# Patient Record
Sex: FEMALE | Race: WHITE | NOT HISPANIC OR LATINO | URBAN - METROPOLITAN AREA
[De-identification: names, ages, dates, MRNs, and addresses within clinical notes are randomized per-mention and may not be internally consistent; named-entity substitution may affect disease eponyms.]

---

## 2017-11-06 ENCOUNTER — INPATIENT (INPATIENT)
Facility: HOSPITAL | Age: 62
LOS: 1 days | Discharge: HOME | DRG: 310 | End: 2017-11-08
Attending: HOSPITALIST | Admitting: STUDENT IN AN ORGANIZED HEALTH CARE EDUCATION/TRAINING PROGRAM
Payer: MEDICAID

## 2017-11-06 ENCOUNTER — TRANSCRIPTION ENCOUNTER (OUTPATIENT)
Age: 62
End: 2017-11-06

## 2017-11-06 VITALS
TEMPERATURE: 98 F | DIASTOLIC BLOOD PRESSURE: 84 MMHG | HEART RATE: 65 BPM | RESPIRATION RATE: 16 BRPM | OXYGEN SATURATION: 95 % | SYSTOLIC BLOOD PRESSURE: 162 MMHG

## 2017-11-06 DIAGNOSIS — R11.2 NAUSEA WITH VOMITING, UNSPECIFIED: ICD-10-CM

## 2017-11-06 DIAGNOSIS — R03.0 ELEVATED BLOOD-PRESSURE READING, WITHOUT DIAGNOSIS OF HYPERTENSION: ICD-10-CM

## 2017-11-06 DIAGNOSIS — R70.0 ELEVATED ERYTHROCYTE SEDIMENTATION RATE: ICD-10-CM

## 2017-11-06 DIAGNOSIS — Z29.9 ENCOUNTER FOR PROPHYLACTIC MEASURES, UNSPECIFIED: ICD-10-CM

## 2017-11-06 DIAGNOSIS — R94.31 ABNORMAL ELECTROCARDIOGRAM [ECG] [EKG]: ICD-10-CM

## 2017-11-06 LAB
ALBUMIN SERPL ELPH-MCNC: 4.4 G/DL — SIGNIFICANT CHANGE UP (ref 3.3–5)
ALP SERPL-CCNC: 59 U/L — SIGNIFICANT CHANGE UP (ref 40–120)
ALT FLD-CCNC: 20 U/L RC — SIGNIFICANT CHANGE UP (ref 10–45)
ANION GAP SERPL CALC-SCNC: 11 MMOL/L — SIGNIFICANT CHANGE UP (ref 5–17)
APTT BLD: 34.7 SEC — SIGNIFICANT CHANGE UP (ref 27.5–37.4)
AST SERPL-CCNC: 21 U/L — SIGNIFICANT CHANGE UP (ref 10–40)
BASE EXCESS BLDV CALC-SCNC: 3 MMOL/L — HIGH (ref -2–2)
BASOPHILS # BLD AUTO: 0 K/UL — SIGNIFICANT CHANGE UP (ref 0–0.2)
BASOPHILS NFR BLD AUTO: 0.4 % — SIGNIFICANT CHANGE UP (ref 0–2)
BILIRUB SERPL-MCNC: 0.4 MG/DL — SIGNIFICANT CHANGE UP (ref 0.2–1.2)
BUN SERPL-MCNC: 16 MG/DL — SIGNIFICANT CHANGE UP (ref 7–23)
CA-I SERPL-SCNC: 1.17 MMOL/L — SIGNIFICANT CHANGE UP (ref 1.12–1.3)
CALCIUM SERPL-MCNC: 9.1 MG/DL — SIGNIFICANT CHANGE UP (ref 8.4–10.5)
CHLORIDE BLDV-SCNC: 106 MMOL/L — SIGNIFICANT CHANGE UP (ref 96–108)
CHLORIDE SERPL-SCNC: 106 MMOL/L — SIGNIFICANT CHANGE UP (ref 96–108)
CK MB BLD-MCNC: 1.6 % — SIGNIFICANT CHANGE UP (ref 0–3.5)
CK MB CFR SERPL CALC: 1.3 NG/ML — SIGNIFICANT CHANGE UP (ref 0–3.8)
CK SERPL-CCNC: 81 U/L — SIGNIFICANT CHANGE UP (ref 25–170)
CO2 BLDV-SCNC: 30 MMOL/L — SIGNIFICANT CHANGE UP (ref 22–30)
CO2 SERPL-SCNC: 27 MMOL/L — SIGNIFICANT CHANGE UP (ref 22–31)
CREAT SERPL-MCNC: 0.72 MG/DL — SIGNIFICANT CHANGE UP (ref 0.5–1.3)
EOSINOPHIL # BLD AUTO: 0 K/UL — SIGNIFICANT CHANGE UP (ref 0–0.5)
EOSINOPHIL NFR BLD AUTO: 0.5 % — SIGNIFICANT CHANGE UP (ref 0–6)
GAS PNL BLDV: 138 MMOL/L — SIGNIFICANT CHANGE UP (ref 136–145)
GAS PNL BLDV: SIGNIFICANT CHANGE UP
GLUCOSE BLDV-MCNC: 118 MG/DL — HIGH (ref 70–99)
GLUCOSE SERPL-MCNC: 121 MG/DL — HIGH (ref 70–99)
HCO3 BLDV-SCNC: 28 MMOL/L — SIGNIFICANT CHANGE UP (ref 21–29)
HCT VFR BLD CALC: 41.2 % — SIGNIFICANT CHANGE UP (ref 34.5–45)
HCT VFR BLDA CALC: 42 % — SIGNIFICANT CHANGE UP (ref 39–50)
HGB BLD CALC-MCNC: 13.8 G/DL — SIGNIFICANT CHANGE UP (ref 11.5–15.5)
HGB BLD-MCNC: 13.6 G/DL — SIGNIFICANT CHANGE UP (ref 11.5–15.5)
INR BLD: 1.15 RATIO — SIGNIFICANT CHANGE UP (ref 0.88–1.16)
LACTATE BLDV-MCNC: 1.2 MMOL/L — SIGNIFICANT CHANGE UP (ref 0.7–2)
LIDOCAIN IGE QN: 26 U/L — SIGNIFICANT CHANGE UP (ref 7–60)
LYMPHOCYTES # BLD AUTO: 1.4 K/UL — SIGNIFICANT CHANGE UP (ref 1–3.3)
LYMPHOCYTES # BLD AUTO: 19.7 % — SIGNIFICANT CHANGE UP (ref 13–44)
MCHC RBC-ENTMCNC: 29.7 PG — SIGNIFICANT CHANGE UP (ref 27–34)
MCHC RBC-ENTMCNC: 33.1 GM/DL — SIGNIFICANT CHANGE UP (ref 32–36)
MCV RBC AUTO: 89.6 FL — SIGNIFICANT CHANGE UP (ref 80–100)
MONOCYTES # BLD AUTO: 0.3 K/UL — SIGNIFICANT CHANGE UP (ref 0–0.9)
MONOCYTES NFR BLD AUTO: 4.4 % — SIGNIFICANT CHANGE UP (ref 2–14)
NEUTROPHILS # BLD AUTO: 5.2 K/UL — SIGNIFICANT CHANGE UP (ref 1.8–7.4)
NEUTROPHILS NFR BLD AUTO: 75.1 % — SIGNIFICANT CHANGE UP (ref 43–77)
PCO2 BLDV: 47 MMHG — SIGNIFICANT CHANGE UP (ref 35–50)
PH BLDV: 7.4 — SIGNIFICANT CHANGE UP (ref 7.35–7.45)
PLATELET # BLD AUTO: 185 K/UL — SIGNIFICANT CHANGE UP (ref 150–400)
PO2 BLDV: 34 MMHG — SIGNIFICANT CHANGE UP (ref 25–45)
POTASSIUM BLDV-SCNC: 4.1 MMOL/L — SIGNIFICANT CHANGE UP (ref 3.5–5)
POTASSIUM SERPL-MCNC: 4.7 MMOL/L — SIGNIFICANT CHANGE UP (ref 3.5–5.3)
POTASSIUM SERPL-SCNC: 4.7 MMOL/L — SIGNIFICANT CHANGE UP (ref 3.5–5.3)
PROT SERPL-MCNC: 7 G/DL — SIGNIFICANT CHANGE UP (ref 6–8.3)
PROTHROM AB SERPL-ACNC: 12.6 SEC — SIGNIFICANT CHANGE UP (ref 9.8–12.7)
RAPID RVP RESULT: SIGNIFICANT CHANGE UP
RBC # BLD: 4.6 M/UL — SIGNIFICANT CHANGE UP (ref 3.8–5.2)
RBC # FLD: 11.7 % — SIGNIFICANT CHANGE UP (ref 10.3–14.5)
SAO2 % BLDV: 61 % — LOW (ref 67–88)
SODIUM SERPL-SCNC: 144 MMOL/L — SIGNIFICANT CHANGE UP (ref 135–145)
TROPONIN T SERPL-MCNC: <0.01 NG/ML — SIGNIFICANT CHANGE UP (ref 0–0.06)
TROPONIN T SERPL-MCNC: <0.01 NG/ML — SIGNIFICANT CHANGE UP (ref 0–0.06)
WBC # BLD: 7 K/UL — SIGNIFICANT CHANGE UP (ref 3.8–10.5)
WBC # FLD AUTO: 7 K/UL — SIGNIFICANT CHANGE UP (ref 3.8–10.5)

## 2017-11-06 PROCEDURE — 74020: CPT | Mod: 26

## 2017-11-06 PROCEDURE — 71010: CPT | Mod: 26

## 2017-11-06 PROCEDURE — 93010 ELECTROCARDIOGRAM REPORT: CPT

## 2017-11-06 PROCEDURE — 99223 1ST HOSP IP/OBS HIGH 75: CPT

## 2017-11-06 PROCEDURE — 99285 EMERGENCY DEPT VISIT HI MDM: CPT | Mod: 25

## 2017-11-06 RX ORDER — ASPIRIN/CALCIUM CARB/MAGNESIUM 324 MG
81 TABLET ORAL DAILY
Qty: 0 | Refills: 0 | Status: DISCONTINUED | OUTPATIENT
Start: 2017-11-06 | End: 2017-11-08

## 2017-11-06 RX ORDER — ONDANSETRON 8 MG/1
4 TABLET, FILM COATED ORAL EVERY 4 HOURS
Qty: 0 | Refills: 0 | Status: DISCONTINUED | OUTPATIENT
Start: 2017-11-06 | End: 2017-11-08

## 2017-11-06 RX ORDER — POLYETHYLENE GLYCOL 3350 17 G/17G
17 POWDER, FOR SOLUTION ORAL DAILY
Qty: 0 | Refills: 0 | Status: DISCONTINUED | OUTPATIENT
Start: 2017-11-06 | End: 2017-11-08

## 2017-11-06 RX ORDER — SODIUM CHLORIDE 9 MG/ML
3 INJECTION INTRAMUSCULAR; INTRAVENOUS; SUBCUTANEOUS ONCE
Qty: 0 | Refills: 0 | Status: COMPLETED | OUTPATIENT
Start: 2017-11-06 | End: 2017-11-06

## 2017-11-06 RX ORDER — ENOXAPARIN SODIUM 100 MG/ML
40 INJECTION SUBCUTANEOUS DAILY
Qty: 0 | Refills: 0 | Status: DISCONTINUED | OUTPATIENT
Start: 2017-11-06 | End: 2017-11-08

## 2017-11-06 RX ORDER — SODIUM CHLORIDE 9 MG/ML
1000 INJECTION INTRAMUSCULAR; INTRAVENOUS; SUBCUTANEOUS
Qty: 0 | Refills: 0 | Status: DISCONTINUED | OUTPATIENT
Start: 2017-11-06 | End: 2017-11-08

## 2017-11-06 RX ORDER — ASPIRIN/CALCIUM CARB/MAGNESIUM 324 MG
325 TABLET ORAL ONCE
Qty: 0 | Refills: 0 | Status: COMPLETED | OUTPATIENT
Start: 2017-11-06 | End: 2017-11-06

## 2017-11-06 RX ORDER — SODIUM CHLORIDE 9 MG/ML
1000 INJECTION INTRAMUSCULAR; INTRAVENOUS; SUBCUTANEOUS ONCE
Qty: 0 | Refills: 0 | Status: COMPLETED | OUTPATIENT
Start: 2017-11-06 | End: 2017-11-06

## 2017-11-06 RX ORDER — ACETAMINOPHEN 500 MG
650 TABLET ORAL EVERY 6 HOURS
Qty: 0 | Refills: 0 | Status: DISCONTINUED | OUTPATIENT
Start: 2017-11-06 | End: 2017-11-08

## 2017-11-06 RX ADMIN — Medication 325 MILLIGRAM(S): at 12:10

## 2017-11-06 RX ADMIN — ENOXAPARIN SODIUM 40 MILLIGRAM(S): 100 INJECTION SUBCUTANEOUS at 18:12

## 2017-11-06 RX ADMIN — SODIUM CHLORIDE 80 MILLILITER(S): 9 INJECTION INTRAMUSCULAR; INTRAVENOUS; SUBCUTANEOUS at 16:12

## 2017-11-06 RX ADMIN — SODIUM CHLORIDE 80 MILLILITER(S): 9 INJECTION INTRAMUSCULAR; INTRAVENOUS; SUBCUTANEOUS at 16:11

## 2017-11-06 RX ADMIN — ONDANSETRON 4 MILLIGRAM(S): 8 TABLET, FILM COATED ORAL at 16:12

## 2017-11-06 RX ADMIN — SODIUM CHLORIDE 1000 MILLILITER(S): 9 INJECTION INTRAMUSCULAR; INTRAVENOUS; SUBCUTANEOUS at 12:11

## 2017-11-06 RX ADMIN — SODIUM CHLORIDE 3 MILLILITER(S): 9 INJECTION INTRAMUSCULAR; INTRAVENOUS; SUBCUTANEOUS at 16:12

## 2017-11-06 NOTE — H&P ADULT - NEGATIVE CARDIOVASCULAR SYMPTOMS
no chest pain/no claudication/no orthopnea/no peripheral edema/no paroxysmal nocturnal dyspnea/no palpitations

## 2017-11-06 NOTE — H&P ADULT - RS GEN PE MLT RESP DETAILS PC
breath sounds equal/no chest wall tenderness/clear to auscultation bilaterally/respirations non-labored/no rhonchi/no wheezes/good air movement/no rales

## 2017-11-06 NOTE — H&P ADULT - PROBLEM SELECTOR PLAN 3
Pt with elevated BP readings, no hx of HTN  Continue to monitor BP for now prior to initiating antihypertensives

## 2017-11-06 NOTE — ED PROVIDER NOTE - CARE PLAN
Principal Discharge DX:	Nausea and vomiting  Secondary Diagnosis:	Exertional dyspnea Principal Discharge DX:	EKG abnormalities  Secondary Diagnosis:	Non-intractable vomiting with nausea, unspecified vomiting type

## 2017-11-06 NOTE — ED PROVIDER NOTE - MEDICAL DECISION MAKING DETAILS
62 year old female otherwise healthy with 3 days of non-specific nausea, vomiting, dizziness and seems to worsen with exertion. Also with 2 weeks of exertional dyspnea. Sent in from  today with EKG abnormalities. ST depressions in the inferior leads. Patient will require cardiac work up, labs, fluids, likely admission. Will treat with full dose aspirin in context of symptoms concerning atypical ACS.

## 2017-11-06 NOTE — H&P ADULT - PROBLEM SELECTOR PLAN 1
Pt with non specific symptoms of nausea/vomiting, lightheadedness, decreased appetite likely in the setting of viral etiology/ gastroenteritis. Pt had similar symptoms in October that resolved   No focal neuro defecits on exam, abdominal exam benign   IVF NS @ 80cc/hr   Zofran prn for nausea, vomiting  Abdominal xray ordered in ED to r/o SBO, pending results  Clears diet for now, advance as tolerated  Check orthostatics

## 2017-11-06 NOTE — ED ADULT NURSE NOTE - OBJECTIVE STATEMENT
61 yo female presents to ED with complaints of lightheadedness and reports EKG changes noticed at urgent care. 63 yo female presents to ED with complaints of lightheadedness and reports EKG changes noticed at urgent care. Patient is A&O x3. 63 yo female presents to ED with complaints of lightheadedness and reports EKG changes noticed at urgent care. Patient is A&O x3. S1 S2 sounds audible. Patient denies chest pain and SOB but complaints of lightheadedness. Lungs clear and equal joaquin. Patient reports nausea and dry heaving without vomitus which prompted her to go to urgent care. Abdomen is soft and non tender. Patient denies fevers, chills, and diarrhea. Skin is warm and dry. VSS/ NAD at this time.  is at bedside. Safety and comfort initiated and maintained. Will continue to monitor.

## 2017-11-06 NOTE — H&P ADULT - HISTORY OF PRESENT ILLNESS
62 year old female with hx of essential tremor presents with symptoms of nausea, vomiting, dizziness, lightheadedness, decreased appetite that began saturday morning. Pt was at a hair salon, left the salon and then experienced nausea, lightheadedness. Pt initially attributed the symptoms to fragrance at the salon however her symptoms persisted through the weekend which prompted her to go to an urgent care center in [place]. The  center performed an EKG and advised pt to come into the ED. Pt reports lightheadedness, like the room is spinning, symptoms worse when she gets up from a lying position, associated with decreased appetite and poor PO intake. Also complains of fatigue, symptoms resolve when she is lying down. Denies headache, blurry vision, symptoms not exacerbated with light or noise. Report episode of nbnb vomiting today with dry heaves. Reports similar symptoms in October which resolved on their own. Denies chest pain, pressure, palpitations, melena, hematochezia, no hematuria or dysuria. Reports dyspnea on exertion on walking a few blocks. Reports she has recently been anxious as her mother passed away 1 year ago around this time. No sick contacts or recent travel history.

## 2017-11-06 NOTE — H&P ADULT - ASSESSMENT
62 year old female with hx of essential tremor presents with symptoms of nausea, vomiting, dizziness, lightheadedness, decreased appetite that began saturday morning. 62 year old female with hx of essential tremor presents with symptoms of nausea, vomiting, dizziness, lightheadedness, decreased appetite that began saturday morning, found to have ST/T changes on EKG

## 2017-11-06 NOTE — PROVIDER CONTACT NOTE (OTHER) - ASSESSMENT
A&Ox4. Pt was sleeping at the time of the episode. Pt denies any discomfort. /71, HR 63, RR 17, Temp 97.7, O2sat 98%RA. Pt is not currently on any beta blockers.

## 2017-11-06 NOTE — ED PROVIDER NOTE - NS_ ATTENDINGSCRIBEDETAILS _ED_A_ED_FT
The scribe's documentation has been prepared under my direction and personally reviewed by me in its entirety. I confirm that the note above accurately reflects all work, treatment, procedures, and medical decision making performed by me (Dr. Carbajal).

## 2017-11-06 NOTE — ED PROVIDER NOTE - OBJECTIVE STATEMENT
61 yo female with no significant pmhx/pshx of presents with slight chest palpations, dizziness, light headed, poor appetite, exertional dyspnea and dry heaving x 4 days. Had similar episode and lasted around the same time period but resolved spontaneously. Went to  and had abnormal EKG and was sent to ED. Currently feeling nauseous. Patient states walking around makes her dizzy and SOB worsens. No fever or chills or cough. No chest pain. Does take Advil for headaches. NKDA.   PMD- Phillip Padgett

## 2017-11-06 NOTE — H&P ADULT - NSHPSOCIALHISTORY_GEN_ALL_CORE
Drinks wine 3-4x/week  Quit smoking 25 years ago, smoked for 7 years, 1/2 pk/day  No iv drug use  Independent with ADL's   Works part time in sales

## 2017-11-06 NOTE — H&P ADULT - NEUROLOGICAL DETAILS
alert and oriented x 3/sensation intact/normal strength/deep reflexes intact/cranial nerves intact/responds to pain/responds to verbal commands

## 2017-11-06 NOTE — PROVIDER CONTACT NOTE (OTHER) - ACTION/TREATMENT ORDERED:
NP agreed with recommendations. Will continue to monitor pt and will notify NP if any changes/ another episode of bradycardia.

## 2017-11-06 NOTE — H&P ADULT - PROBLEM SELECTOR PLAN 2
Pt with ST depressions in II, III, avF, and V4-V6  EKG per outpt records in 2016 sinus bradycardia 47 no st/t changes  First troponin negative  No hx of angina, chest pressure or palpitations  Trend serial troponins and EKG  Tele monitoring  If serial troponins uptrending, manage as nstemi and call cardiology evaluation   Check echo to evaluate LV function

## 2017-11-07 DIAGNOSIS — R00.1 BRADYCARDIA, UNSPECIFIED: ICD-10-CM

## 2017-11-07 DIAGNOSIS — R11.2 NAUSEA WITH VOMITING, UNSPECIFIED: ICD-10-CM

## 2017-11-07 LAB
ALBUMIN SERPL ELPH-MCNC: 3.8 G/DL — SIGNIFICANT CHANGE UP (ref 3.3–5)
ALP SERPL-CCNC: 51 U/L — SIGNIFICANT CHANGE UP (ref 40–120)
ALT FLD-CCNC: 17 U/L RC — SIGNIFICANT CHANGE UP (ref 10–45)
ANION GAP SERPL CALC-SCNC: 11 MMOL/L — SIGNIFICANT CHANGE UP (ref 5–17)
ANION GAP SERPL CALC-SCNC: 11 MMOL/L — SIGNIFICANT CHANGE UP (ref 5–17)
APPEARANCE UR: CLEAR — SIGNIFICANT CHANGE UP
AST SERPL-CCNC: 18 U/L — SIGNIFICANT CHANGE UP (ref 10–40)
BASOPHILS # BLD AUTO: 0 K/UL — SIGNIFICANT CHANGE UP (ref 0–0.2)
BASOPHILS NFR BLD AUTO: 0.3 % — SIGNIFICANT CHANGE UP (ref 0–2)
BILIRUB SERPL-MCNC: 0.5 MG/DL — SIGNIFICANT CHANGE UP (ref 0.2–1.2)
BILIRUB UR-MCNC: NEGATIVE — SIGNIFICANT CHANGE UP
BUN SERPL-MCNC: 11 MG/DL — SIGNIFICANT CHANGE UP (ref 7–23)
BUN SERPL-MCNC: 12 MG/DL — SIGNIFICANT CHANGE UP (ref 7–23)
CALCIUM SERPL-MCNC: 8.4 MG/DL — SIGNIFICANT CHANGE UP (ref 8.4–10.5)
CALCIUM SERPL-MCNC: 8.4 MG/DL — SIGNIFICANT CHANGE UP (ref 8.4–10.5)
CHLORIDE SERPL-SCNC: 106 MMOL/L — SIGNIFICANT CHANGE UP (ref 96–108)
CHLORIDE SERPL-SCNC: 107 MMOL/L — SIGNIFICANT CHANGE UP (ref 96–108)
CHOLEST SERPL-MCNC: 185 MG/DL — SIGNIFICANT CHANGE UP (ref 10–199)
CK MB BLD-MCNC: 2.3 % — SIGNIFICANT CHANGE UP (ref 0–3.5)
CK MB CFR SERPL CALC: 1.6 NG/ML — SIGNIFICANT CHANGE UP (ref 0–3.8)
CK SERPL-CCNC: 69 U/L — SIGNIFICANT CHANGE UP (ref 25–170)
CO2 SERPL-SCNC: 24 MMOL/L — SIGNIFICANT CHANGE UP (ref 22–31)
CO2 SERPL-SCNC: 24 MMOL/L — SIGNIFICANT CHANGE UP (ref 22–31)
COLOR SPEC: YELLOW — SIGNIFICANT CHANGE UP
CREAT SERPL-MCNC: 0.7 MG/DL — SIGNIFICANT CHANGE UP (ref 0.5–1.3)
CREAT SERPL-MCNC: 0.72 MG/DL — SIGNIFICANT CHANGE UP (ref 0.5–1.3)
DIFF PNL FLD: NEGATIVE — SIGNIFICANT CHANGE UP
EOSINOPHIL # BLD AUTO: 0.1 K/UL — SIGNIFICANT CHANGE UP (ref 0–0.5)
EOSINOPHIL NFR BLD AUTO: 1.1 % — SIGNIFICANT CHANGE UP (ref 0–6)
GLUCOSE SERPL-MCNC: 88 MG/DL — SIGNIFICANT CHANGE UP (ref 70–99)
GLUCOSE SERPL-MCNC: 89 MG/DL — SIGNIFICANT CHANGE UP (ref 70–99)
GLUCOSE UR QL: NEGATIVE — SIGNIFICANT CHANGE UP
HBA1C BLD-MCNC: 5.7 % — HIGH (ref 4–5.6)
HCT VFR BLD CALC: 36.1 % — SIGNIFICANT CHANGE UP (ref 34.5–45)
HCT VFR BLD CALC: 36.9 % — SIGNIFICANT CHANGE UP (ref 34.5–45)
HDLC SERPL-MCNC: 51 MG/DL — SIGNIFICANT CHANGE UP (ref 40–125)
HGB BLD-MCNC: 12.4 G/DL — SIGNIFICANT CHANGE UP (ref 11.5–15.5)
HGB BLD-MCNC: 12.7 G/DL — SIGNIFICANT CHANGE UP (ref 11.5–15.5)
KETONES UR-MCNC: NEGATIVE — SIGNIFICANT CHANGE UP
LEUKOCYTE ESTERASE UR-ACNC: ABNORMAL
LIPID PNL WITH DIRECT LDL SERPL: 111 MG/DL — SIGNIFICANT CHANGE UP
LYMPHOCYTES # BLD AUTO: 1.8 K/UL — SIGNIFICANT CHANGE UP (ref 1–3.3)
LYMPHOCYTES # BLD AUTO: 36 % — SIGNIFICANT CHANGE UP (ref 13–44)
MAGNESIUM SERPL-MCNC: 2 MG/DL — SIGNIFICANT CHANGE UP (ref 1.6–2.6)
MCHC RBC-ENTMCNC: 30.6 PG — SIGNIFICANT CHANGE UP (ref 27–34)
MCHC RBC-ENTMCNC: 30.7 PG — SIGNIFICANT CHANGE UP (ref 27–34)
MCHC RBC-ENTMCNC: 34.3 GM/DL — SIGNIFICANT CHANGE UP (ref 32–36)
MCHC RBC-ENTMCNC: 34.5 GM/DL — SIGNIFICANT CHANGE UP (ref 32–36)
MCV RBC AUTO: 89 FL — SIGNIFICANT CHANGE UP (ref 80–100)
MCV RBC AUTO: 89.3 FL — SIGNIFICANT CHANGE UP (ref 80–100)
MONOCYTES # BLD AUTO: 0.4 K/UL — SIGNIFICANT CHANGE UP (ref 0–0.9)
MONOCYTES NFR BLD AUTO: 7.6 % — SIGNIFICANT CHANGE UP (ref 2–14)
NEUTROPHILS # BLD AUTO: 2.8 K/UL — SIGNIFICANT CHANGE UP (ref 1.8–7.4)
NEUTROPHILS NFR BLD AUTO: 55 % — SIGNIFICANT CHANGE UP (ref 43–77)
NITRITE UR-MCNC: NEGATIVE — SIGNIFICANT CHANGE UP
PH UR: 6.5 — SIGNIFICANT CHANGE UP (ref 5–8)
PHOSPHATE SERPL-MCNC: 3 MG/DL — SIGNIFICANT CHANGE UP (ref 2.5–4.5)
PLATELET # BLD AUTO: 159 K/UL — SIGNIFICANT CHANGE UP (ref 150–400)
PLATELET # BLD AUTO: 161 K/UL — SIGNIFICANT CHANGE UP (ref 150–400)
POTASSIUM SERPL-MCNC: 3.8 MMOL/L — SIGNIFICANT CHANGE UP (ref 3.5–5.3)
POTASSIUM SERPL-MCNC: 3.9 MMOL/L — SIGNIFICANT CHANGE UP (ref 3.5–5.3)
POTASSIUM SERPL-SCNC: 3.8 MMOL/L — SIGNIFICANT CHANGE UP (ref 3.5–5.3)
POTASSIUM SERPL-SCNC: 3.9 MMOL/L — SIGNIFICANT CHANGE UP (ref 3.5–5.3)
PROT SERPL-MCNC: 6 G/DL — SIGNIFICANT CHANGE UP (ref 6–8.3)
PROT UR-MCNC: NEGATIVE — SIGNIFICANT CHANGE UP
RBC # BLD: 4.06 M/UL — SIGNIFICANT CHANGE UP (ref 3.8–5.2)
RBC # BLD: 4.14 M/UL — SIGNIFICANT CHANGE UP (ref 3.8–5.2)
RBC # FLD: 11.5 % — SIGNIFICANT CHANGE UP (ref 10.3–14.5)
RBC # FLD: 11.6 % — SIGNIFICANT CHANGE UP (ref 10.3–14.5)
SODIUM SERPL-SCNC: 141 MMOL/L — SIGNIFICANT CHANGE UP (ref 135–145)
SODIUM SERPL-SCNC: 142 MMOL/L — SIGNIFICANT CHANGE UP (ref 135–145)
SP GR SPEC: 1.02 — SIGNIFICANT CHANGE UP (ref 1.01–1.02)
TOTAL CHOLESTEROL/HDL RATIO MEASUREMENT: 3.6 RATIO — SIGNIFICANT CHANGE UP (ref 3.3–7.1)
TRIGL SERPL-MCNC: 114 MG/DL — SIGNIFICANT CHANGE UP (ref 10–149)
TROPONIN T SERPL-MCNC: <0.01 NG/ML — SIGNIFICANT CHANGE UP (ref 0–0.06)
TSH SERPL-MCNC: 3.94 UIU/ML — SIGNIFICANT CHANGE UP (ref 0.27–4.2)
UROBILINOGEN FLD QL: NEGATIVE — SIGNIFICANT CHANGE UP
WBC # BLD: 5.1 K/UL — SIGNIFICANT CHANGE UP (ref 3.8–10.5)
WBC # BLD: 5.4 K/UL — SIGNIFICANT CHANGE UP (ref 3.8–10.5)
WBC # FLD AUTO: 5.1 K/UL — SIGNIFICANT CHANGE UP (ref 3.8–10.5)
WBC # FLD AUTO: 5.4 K/UL — SIGNIFICANT CHANGE UP (ref 3.8–10.5)

## 2017-11-07 PROCEDURE — 93010 ELECTROCARDIOGRAM REPORT: CPT

## 2017-11-07 PROCEDURE — 99233 SBSQ HOSP IP/OBS HIGH 50: CPT

## 2017-11-07 RX ADMIN — SODIUM CHLORIDE 80 MILLILITER(S): 9 INJECTION INTRAMUSCULAR; INTRAVENOUS; SUBCUTANEOUS at 02:04

## 2017-11-07 RX ADMIN — Medication 650 MILLIGRAM(S): at 18:54

## 2017-11-07 RX ADMIN — Medication 650 MILLIGRAM(S): at 15:57

## 2017-11-07 RX ADMIN — ENOXAPARIN SODIUM 40 MILLIGRAM(S): 100 INJECTION SUBCUTANEOUS at 11:14

## 2017-11-07 RX ADMIN — Medication 81 MILLIGRAM(S): at 11:14

## 2017-11-07 NOTE — PROGRESS NOTE ADULT - SUBJECTIVE AND OBJECTIVE BOX
Patient is a 62y old  Female who presents with a chief complaint of Nausea and dizziness (06 Nov 2017 15:41)      SUBJECTIVE / OVERNIGHT EVENTS: no acute events overnight. Patient's nausea and vomiting have improved. She feels better thos morning. Denies any chest pain or chest pressure.     MEDICATIONS  (STANDING):  aspirin  chewable 81 milliGRAM(s) Oral daily  enoxaparin Injectable 40 milliGRAM(s) SubCutaneous daily  sodium chloride 0.9%. 1000 milliLiter(s) (80 mL/Hr) IV Continuous <Continuous>    MEDICATIONS  (PRN):  acetaminophen   Tablet. 650 milliGRAM(s) Oral every 6 hours PRN Mild Pain (1 - 3)  ondansetron Injectable 4 milliGRAM(s) IV Push every 4 hours PRN Nausea and/or Vomiting  polyethylene glycol 3350 17 Gram(s) Oral daily PRN Constipation      Vital Signs Last 24 Hrs  T(C): 36.3 (07 Nov 2017 14:53), Max: 36.8 (06 Nov 2017 17:41)  T(F): 97.4 (07 Nov 2017 14:53), Max: 98.2 (06 Nov 2017 17:41)  HR: 59 (07 Nov 2017 14:53) (47 - 63)  BP: 115/69 (07 Nov 2017 14:53) (115/69 - 145/87)  BP(mean): --  RR: 16 (07 Nov 2017 14:53) (16 - 18)  SpO2: 97% (07 Nov 2017 14:53) (95% - 98%)  CAPILLARY BLOOD GLUCOSE        I&O's Summary    06 Nov 2017 07:01  -  07 Nov 2017 07:00  --------------------------------------------------------  IN: 960 mL / OUT: 0 mL / NET: 960 mL    07 Nov 2017 07:01  -  07 Nov 2017 15:24  --------------------------------------------------------  IN: 240 mL / OUT: 200 mL / NET: 40 mL        PHYSICAL EXAM:  GENERAL: NAD, resting comfortably   HEAD:  Atraumatic, Normocephalic  EYES: EOMI, PERRLA, conjunctiva and sclera clear  NECK: Supple,   CHEST/LUNG: Clear to auscultation bilaterally; No wheeze  HEART: Regular rate and rhythm; No murmurs  ABDOMEN: Soft, Nontender, Nondistended; Bowel sounds present  EXTREMITIES:  2+ Peripheral Pulses, No clubbing, cyanosis, or edema  PSYCH: AAOx3  NEUROLOGY: non-focal      LABS:                        12.7   5.1   )-----------( 161      ( 07 Nov 2017 04:55 )             36.9     11-07    142  |  107  |  11  ----------------------------<  89  3.8   |  24  |  0.72    Ca    8.4      07 Nov 2017 04:55  Phos  3.0     11-07  Mg     2.0     11-07    TPro  6.0  /  Alb  3.8  /  TBili  0.5  /  DBili  x   /  AST  18  /  ALT  17  /  AlkPhos  51  11-07    PT/INR - ( 06 Nov 2017 12:18 )   PT: 12.6 sec;   INR: 1.15 ratio         PTT - ( 06 Nov 2017 12:18 )  PTT:34.7 sec  CARDIAC MARKERS ( 07 Nov 2017 00:07 )  x     / <0.01 ng/mL / 69 U/L / x     / 1.6 ng/mL  CARDIAC MARKERS ( 06 Nov 2017 16:43 )  x     / <0.01 ng/mL / 81 U/L / x     / 1.3 ng/mL  CARDIAC MARKERS ( 06 Nov 2017 12:18 )  x     / <0.01 ng/mL / x     / x     / x

## 2017-11-07 NOTE — PROGRESS NOTE ADULT - PROBLEM SELECTOR PLAN 2
-echo is pending, ST abnormalities noted on admitting EKG   -concern that nausea and vomiting may not be from a true gastroenteritis, ?atypical cardiac symptoms?   -cardiology consult placed

## 2017-11-07 NOTE — PROVIDER CONTACT NOTE (OTHER) - ACTION/TREATMENT ORDERED:
NP agreed with above recommendations and NP will review/order labs for pt. Will continue to monitor pt.

## 2017-11-08 ENCOUNTER — TRANSCRIPTION ENCOUNTER (OUTPATIENT)
Age: 62
End: 2017-11-08

## 2017-11-08 VITALS
TEMPERATURE: 98 F | DIASTOLIC BLOOD PRESSURE: 81 MMHG | SYSTOLIC BLOOD PRESSURE: 170 MMHG | OXYGEN SATURATION: 99 % | RESPIRATION RATE: 17 BRPM

## 2017-11-08 LAB
CULTURE RESULTS: SIGNIFICANT CHANGE UP
SPECIMEN SOURCE: SIGNIFICANT CHANGE UP

## 2017-11-08 PROCEDURE — 93017 CV STRESS TEST TRACING ONLY: CPT

## 2017-11-08 PROCEDURE — 82947 ASSAY GLUCOSE BLOOD QUANT: CPT

## 2017-11-08 PROCEDURE — 93306 TTE W/DOPPLER COMPLETE: CPT | Mod: 26

## 2017-11-08 PROCEDURE — 99223 1ST HOSP IP/OBS HIGH 75: CPT | Mod: GC

## 2017-11-08 PROCEDURE — 83735 ASSAY OF MAGNESIUM: CPT

## 2017-11-08 PROCEDURE — 74020: CPT

## 2017-11-08 PROCEDURE — 80048 BASIC METABOLIC PNL TOTAL CA: CPT

## 2017-11-08 PROCEDURE — 85610 PROTHROMBIN TIME: CPT

## 2017-11-08 PROCEDURE — 71045 X-RAY EXAM CHEST 1 VIEW: CPT

## 2017-11-08 PROCEDURE — 85014 HEMATOCRIT: CPT

## 2017-11-08 PROCEDURE — 93306 TTE W/DOPPLER COMPLETE: CPT

## 2017-11-08 PROCEDURE — 85730 THROMBOPLASTIN TIME PARTIAL: CPT

## 2017-11-08 PROCEDURE — 87798 DETECT AGENT NOS DNA AMP: CPT

## 2017-11-08 PROCEDURE — 78452 HT MUSCLE IMAGE SPECT MULT: CPT | Mod: 26

## 2017-11-08 PROCEDURE — 82330 ASSAY OF CALCIUM: CPT

## 2017-11-08 PROCEDURE — 93016 CV STRESS TEST SUPVJ ONLY: CPT

## 2017-11-08 PROCEDURE — 87581 M.PNEUMON DNA AMP PROBE: CPT

## 2017-11-08 PROCEDURE — 87086 URINE CULTURE/COLONY COUNT: CPT

## 2017-11-08 PROCEDURE — 82553 CREATINE MB FRACTION: CPT

## 2017-11-08 PROCEDURE — 84295 ASSAY OF SERUM SODIUM: CPT

## 2017-11-08 PROCEDURE — 78452 HT MUSCLE IMAGE SPECT MULT: CPT

## 2017-11-08 PROCEDURE — 85027 COMPLETE CBC AUTOMATED: CPT

## 2017-11-08 PROCEDURE — 80053 COMPREHEN METABOLIC PANEL: CPT

## 2017-11-08 PROCEDURE — 93018 CV STRESS TEST I&R ONLY: CPT

## 2017-11-08 PROCEDURE — 84443 ASSAY THYROID STIM HORMONE: CPT

## 2017-11-08 PROCEDURE — 83605 ASSAY OF LACTIC ACID: CPT

## 2017-11-08 PROCEDURE — 83036 HEMOGLOBIN GLYCOSYLATED A1C: CPT

## 2017-11-08 PROCEDURE — 87633 RESP VIRUS 12-25 TARGETS: CPT

## 2017-11-08 PROCEDURE — 84100 ASSAY OF PHOSPHORUS: CPT

## 2017-11-08 PROCEDURE — 93005 ELECTROCARDIOGRAM TRACING: CPT

## 2017-11-08 PROCEDURE — 82550 ASSAY OF CK (CPK): CPT

## 2017-11-08 PROCEDURE — 82803 BLOOD GASES ANY COMBINATION: CPT

## 2017-11-08 PROCEDURE — 83690 ASSAY OF LIPASE: CPT

## 2017-11-08 PROCEDURE — 84132 ASSAY OF SERUM POTASSIUM: CPT

## 2017-11-08 PROCEDURE — 82435 ASSAY OF BLOOD CHLORIDE: CPT

## 2017-11-08 PROCEDURE — 80061 LIPID PANEL: CPT

## 2017-11-08 PROCEDURE — A9500: CPT

## 2017-11-08 PROCEDURE — 99285 EMERGENCY DEPT VISIT HI MDM: CPT | Mod: 25

## 2017-11-08 PROCEDURE — 87486 CHLMYD PNEUM DNA AMP PROBE: CPT

## 2017-11-08 PROCEDURE — 81001 URINALYSIS AUTO W/SCOPE: CPT

## 2017-11-08 PROCEDURE — 84484 ASSAY OF TROPONIN QUANT: CPT

## 2017-11-08 PROCEDURE — 99233 SBSQ HOSP IP/OBS HIGH 50: CPT

## 2017-11-08 RX ORDER — ASPIRIN/CALCIUM CARB/MAGNESIUM 324 MG
1 TABLET ORAL
Qty: 0 | Refills: 0 | COMMUNITY
Start: 2017-11-08

## 2017-11-08 RX ADMIN — Medication 81 MILLIGRAM(S): at 12:08

## 2017-11-08 NOTE — PROGRESS NOTE ADULT - PROBLEM SELECTOR PLAN 2
-echo was WNL   -stress test is pending  -cardio consult placed, to see patient today -echo was WNL   -stress test was done, results are pending, will f/u  -if stress test negative, can be discharged home today

## 2017-11-08 NOTE — CONSULT NOTE ADULT - SUBJECTIVE AND OBJECTIVE BOX
Date of Admission: 11/6/17     Patient is a 62y old  Female who presents with a chief complaint of Nausea and dizziness (06 Nov 2017 15:41)      HISTORY OF PRESENT ILLNESS:       Allergies    No Known Allergies    Intolerances    	    MEDICATIONS:  aspirin  chewable 81 milliGRAM(s) Oral daily  enoxaparin Injectable 40 milliGRAM(s) SubCutaneous daily        acetaminophen   Tablet. 650 milliGRAM(s) Oral every 6 hours PRN  ondansetron Injectable 4 milliGRAM(s) IV Push every 4 hours PRN    polyethylene glycol 3350 17 Gram(s) Oral daily PRN      sodium chloride 0.9%. 1000 milliLiter(s) IV Continuous <Continuous>      PAST MEDICAL & SURGICAL HISTORY:  No pertinent past medical history  No significant past surgical history      FAMILY HISTORY:      SOCIAL HISTORY:    Drinks wine 3-4x/week  Former Smoker Quit smoking 25 years ago, smoked for 7 years, 1/2 pk/day      REVIEW OF SYSTEMS:    CONSTITUTIONAL: No weakness, fevers or chills  EYES/ENT: No visual changes;  No dysphagia  NECK: No pain or stiffness  RESPIRATORY: No cough, wheezing, hemoptysis; No shortness of breath  CARDIOVASCULAR: No chest pain or palpitations; No lower extremity edema  GASTROINTESTINAL: No abdominal or epigastric pain. No nausea, vomiting, or hematemesis; No diarrhea or constipation. No melena or hematochezia.  BACK: No back pain  GENITOURINARY: No dysuria, frequency or hematuria  NEUROLOGICAL: No numbness or weakness  SKIN: No itching, burning, rashes, or lesions   All other review of systems is negative unless indicated above.  PHYSICAL EXAM:  T(C): 36.3 (11-08-17 @ 12:43), Max: 36.8 (11-08-17 @ 04:02)  HR: 61 (11-08-17 @ 12:43) (51 - 61)  BP: 120/76 (11-08-17 @ 12:43) (105/65 - 131/85)  RR: 17 (11-08-17 @ 12:43) (16 - 17)  SpO2: 98% (11-08-17 @ 12:43) (96% - 98%)  Wt(kg): --  I&O's Summary    07 Nov 2017 07:01  -  08 Nov 2017 07:00  --------------------------------------------------------  IN: 240 mL / OUT: 300 mL / NET: -60 mL    08 Nov 2017 07:01  -  08 Nov 2017 15:13  --------------------------------------------------------  IN: 600 mL / OUT: 0 mL / NET: 600 mL        Appearance: Normal	  HEENT:   Normal oral mucosa, PERRL, EOMI	  Lymphatic: No lymphadenopathy  Cardiovascular: Normal S1 S2, No JVD, No murmurs, No edema  Respiratory: Lungs clear to auscultation	  Psychiatry: A & O x 3, Mood & affect appropriate  Gastrointestinal:  Soft, Non-tender, + BS	  Skin: No rashes, No ecchymoses, No cyanosis	  Neurologic: Non-focal  Extremities: Normal range of motion, No clubbing, cyanosis or edema  Vascular: Peripheral pulses palpable 2+ bilaterally        LABS:	 	    CBC Full  -  ( 07 Nov 2017 04:55 )  WBC Count : 5.1 K/uL  Hemoglobin : 12.7 g/dL  Hematocrit : 36.9 %  Platelet Count - Automated : 161 K/uL  Mean Cell Volume : 89.3 fl  Mean Cell Hemoglobin : 30.6 pg  Mean Cell Hemoglobin Concentration : 34.3 gm/dL  Auto Neutrophil # : 2.8 K/uL  Auto Lymphocyte # : 1.8 K/uL  Auto Monocyte # : 0.4 K/uL  Auto Eosinophil # : 0.1 K/uL  Auto Basophil # : 0.0 K/uL  Auto Neutrophil % : 55.0 %  Auto Lymphocyte % : 36.0 %  Auto Monocyte % : 7.6 %  Auto Eosinophil % : 1.1 %  Auto Basophil % : 0.3 %    11-07    142  |  107  |  11  ----------------------------<  89  3.8   |  24  |  0.72  11-07    141  |  106  |  12  ----------------------------<  88  3.9   |  24  |  0.70    Ca    8.4      07 Nov 2017 04:55  Ca    8.4      07 Nov 2017 04:54  Phos  3.0     11-07  Mg     2.0     11-07    TPro  6.0  /  Alb  3.8  /  TBili  0.5  /  DBili  x   /  AST  18  /  ALT  17  /  AlkPhos  51  11-07      proBNP:   Lipid Profile:   HgA1c:   TSH:       CARDIAC MARKERS:            TELEMETRY: 	    ECG:  	  RADIOLOGY:  OTHER: 	    PREVIOUS DIAGNOSTIC TESTING:    [ ] Echocardiogram:  [ ]  Catheterization:  [ ] Stress Test:  	  	  ASSESSMENT/PLAN: Date of Admission: 11/6/17     Patient is a 62y old  Female who presents with a chief complaint of Nausea and dizziness (06 Nov 2017 15:41)      HISTORY OF PRESENT ILLNESS:      61 yo woman w/PMHx HLD sinus bradycardia and essential tremor presenting with nausea/vomiting and lightheadedness on standing cardiology consulted for ST depressions on EKG. Symptoms of nausea/vomiting improved. During this hospitalization, she underwent a TTE with normal LV function and no segmental wall motion abnormalities. During exercise nuclear stress she experienced chest pain.       Allergies    No Known Allergies    Intolerances    	    MEDICATIONS:  aspirin  chewable 81 milliGRAM(s) Oral daily  enoxaparin Injectable 40 milliGRAM(s) SubCutaneous daily        acetaminophen   Tablet. 650 milliGRAM(s) Oral every 6 hours PRN  ondansetron Injectable 4 milliGRAM(s) IV Push every 4 hours PRN    polyethylene glycol 3350 17 Gram(s) Oral daily PRN      sodium chloride 0.9%. 1000 milliLiter(s) IV Continuous <Continuous>      PAST MEDICAL & SURGICAL HISTORY:  No pertinent past medical history  No significant past surgical history      FAMILY HISTORY:      SOCIAL HISTORY:    Drinks wine 3-4x/week  Former Smoker Quit smoking 25 years ago, smoked for 7 years, 1/2 pk/day      REVIEW OF SYSTEMS:    CONSTITUTIONAL: No weakness, fevers or chills  EYES/ENT: No visual changes;  No dysphagia  NECK: No pain or stiffness  RESPIRATORY: No cough, wheezing, hemoptysis; No shortness of breath  CARDIOVASCULAR: No chest pain or palpitations; No lower extremity edema  GASTROINTESTINAL: No abdominal or epigastric pain. No nausea, vomiting, or hematemesis; No diarrhea or constipation. No melena or hematochezia.  BACK: No back pain  GENITOURINARY: No dysuria, frequency or hematuria  NEUROLOGICAL: No numbness or weakness  SKIN: No itching, burning, rashes, or lesions   All other review of systems is negative unless indicated above.  PHYSICAL EXAM:  T(C): 36.3 (11-08-17 @ 12:43), Max: 36.8 (11-08-17 @ 04:02)  HR: 61 (11-08-17 @ 12:43) (51 - 61)  BP: 120/76 (11-08-17 @ 12:43) (105/65 - 131/85)  RR: 17 (11-08-17 @ 12:43) (16 - 17)  SpO2: 98% (11-08-17 @ 12:43) (96% - 98%)  Wt(kg): --  I&O's Summary    07 Nov 2017 07:01  -  08 Nov 2017 07:00  --------------------------------------------------------  IN: 240 mL / OUT: 300 mL / NET: -60 mL    08 Nov 2017 07:01  -  08 Nov 2017 15:13  --------------------------------------------------------  IN: 600 mL / OUT: 0 mL / NET: 600 mL        Appearance: Normal	  HEENT:   Normal oral mucosa, PERRL, EOMI	  Lymphatic: No lymphadenopathy  Cardiovascular: Normal S1 S2, No JVD, No murmurs, No edema  Respiratory: Lungs clear to auscultation	  Psychiatry: A & O x 3, Mood & affect appropriate  Gastrointestinal:  Soft, Non-tender, + BS	  Skin: No rashes, No ecchymoses, No cyanosis	  Neurologic: Non-focal  Extremities: Normal range of motion, No clubbing, cyanosis or edema  Vascular: Peripheral pulses palpable 2+ bilaterally        LABS:	 	    CBC Full  -  ( 07 Nov 2017 04:55 )  WBC Count : 5.1 K/uL  Hemoglobin : 12.7 g/dL  Hematocrit : 36.9 %  Platelet Count - Automated : 161 K/uL  Mean Cell Volume : 89.3 fl  Mean Cell Hemoglobin : 30.6 pg  Mean Cell Hemoglobin Concentration : 34.3 gm/dL  Auto Neutrophil # : 2.8 K/uL  Auto Lymphocyte # : 1.8 K/uL  Auto Monocyte # : 0.4 K/uL  Auto Eosinophil # : 0.1 K/uL  Auto Basophil # : 0.0 K/uL  Auto Neutrophil % : 55.0 %  Auto Lymphocyte % : 36.0 %  Auto Monocyte % : 7.6 %  Auto Eosinophil % : 1.1 %  Auto Basophil % : 0.3 %    11-07    142  |  107  |  11  ----------------------------<  89  3.8   |  24  |  0.72  11-07    141  |  106  |  12  ----------------------------<  88  3.9   |  24  |  0.70    Ca    8.4      07 Nov 2017 04:55  Ca    8.4      07 Nov 2017 04:54  Phos  3.0     11-07  Mg     2.0     11-07    TPro  6.0  /  Alb  3.8  /  TBili  0.5  /  DBili  x   /  AST  18  /  ALT  17  /  AlkPhos  51  11-07    TELEMETRY: 	    ECG:  	  RADIOLOGY:  OTHER: 	    PREVIOUS DIAGNOSTIC TESTING:    [ ] Echocardiogram: < from: Transthoracic Echocardiogram (11.08.17 @ 15:31) >  Dimensions:    Normal Values:  LA:     2.7    2.0 - 4.0 cm  Ao:     2.5    2.0 - 3.8 cm  SEPTUM: 0.6    0.6 - 1.2 cm  PWT:    0.8    0.6 - 1.1 cm  LVIDd:  3.9    3.0 - 5.6 cm  LVIDs:  2.5    1.8 - 4.0 cm  Derived variables:  LVMI: 51 g/m2  RWT: 0.41  Fractional short: 36 %  EF (Visual Estimate): 65 %  EF (Teicholtz): 66 %    Conclusions:  1. Normal left ventricular internal dimensions and wall  thicknesses.  2. Normal left ventricular systolic function. No segmental  wall motion abnormalities.  3. Normal diastolic function  4. Normal right ventricular size and function.  5. Estimated pulmonary artery systolic pressure equals 22  mm Hg, assuming right atrial pressure equals 8  mm Hg,  consistent with normal pulmonary pressures.  *** No previous Echo exam.  ---------------------------------------------    ASSESSMENT/PLAN: Date of Admission: 11/6/17     Patient is a 62y old  Female who presents with a chief complaint of Nausea and dizziness (06 Nov 2017 15:41)      HISTORY OF PRESENT ILLNESS:     61 yo woman w/PMHx HLD sinus bradycardia and essential tremor presenting with nausea/vomiting and lightheadedness on standing cardiology consulted for ST depressions on EKG. Symptoms of nausea/vomiting improved. During this hospitalization, she underwent a TTE with normal LV function and no segmental wall motion abnormalities. After exercising on the treadmill during nuclear stress she noted non-radiating 5/10 chest pain that resolved after 1 minute. She noted TREJO while walking on the treadmill. She reports that she regularly walks 20blocks to work at least 3x a week without any exertional symptoms.        Allergies    No Known Allergies    Intolerances    	    MEDICATIONS:  aspirin  chewable 81 milliGRAM(s) Oral daily  enoxaparin Injectable 40 milliGRAM(s) SubCutaneous daily        acetaminophen   Tablet. 650 milliGRAM(s) Oral every 6 hours PRN  ondansetron Injectable 4 milliGRAM(s) IV Push every 4 hours PRN    polyethylene glycol 3350 17 Gram(s) Oral daily PRN      sodium chloride 0.9%. 1000 milliLiter(s) IV Continuous <Continuous>      PAST MEDICAL & SURGICAL HISTORY:  No pertinent past medical history  No significant past surgical history      FAMILY HISTORY:      SOCIAL HISTORY:    Drinks wine 3-4x/week  Former Smoker Quit smoking 25 years ago, smoked for 7 years, 1/2 pk/day      REVIEW OF SYSTEMS:    CONSTITUTIONAL: No weakness, fevers or chills  EYES/ENT: No visual changes;  No dysphagia  NECK: No pain or stiffness  RESPIRATORY: No cough, wheezing, hemoptysis; No shortness of breath  CARDIOVASCULAR: No chest pain or palpitations; No lower extremity edema  GASTROINTESTINAL: No abdominal or epigastric pain. No nausea, vomiting, or hematemesis; No diarrhea or constipation. No melena or hematochezia.  BACK: No back pain  GENITOURINARY: No dysuria, frequency or hematuria  NEUROLOGICAL: No numbness or weakness  SKIN: No itching, burning, rashes, or lesions   All other review of systems is negative unless indicated above.  PHYSICAL EXAM:  T(C): 36.3 (11-08-17 @ 12:43), Max: 36.8 (11-08-17 @ 04:02)  HR: 61 (11-08-17 @ 12:43) (51 - 61)  BP: 120/76 (11-08-17 @ 12:43) (105/65 - 131/85)  RR: 17 (11-08-17 @ 12:43) (16 - 17)  SpO2: 98% (11-08-17 @ 12:43) (96% - 98%)  Wt(kg): --  I&O's Summary    07 Nov 2017 07:01  -  08 Nov 2017 07:00  --------------------------------------------------------  IN: 240 mL / OUT: 300 mL / NET: -60 mL    08 Nov 2017 07:01  -  08 Nov 2017 15:13  --------------------------------------------------------  IN: 600 mL / OUT: 0 mL / NET: 600 mL        Appearance: Well appearing, pleasant  HEENT:   Normal oral mucosa, PERRL, EOMI	  Lymphatic: No lymphadenopathy  Cardiovascular: Normal S1 S2, No JVD, No murmurs, No edema  Respiratory: Lungs clear to auscultation	  Psychiatry: A & O x 3, Mood & affect appropriate  Gastrointestinal:  Soft, Non-tender, + BS	  Skin: No rashes, No ecchymoses, No cyanosis	  Neurologic: Non-focal  Extremities: Normal range of motion, No clubbing, cyanosis or edema  Vascular: Peripheral pulses palpable 2+ bilaterally        LABS:	 	    CBC Full  -  ( 07 Nov 2017 04:55 )  WBC Count : 5.1 K/uL  Hemoglobin : 12.7 g/dL  Hematocrit : 36.9 %  Platelet Count - Automated : 161 K/uL  Mean Cell Volume : 89.3 fl  Mean Cell Hemoglobin : 30.6 pg  Mean Cell Hemoglobin Concentration : 34.3 gm/dL  Auto Neutrophil # : 2.8 K/uL  Auto Lymphocyte # : 1.8 K/uL  Auto Monocyte # : 0.4 K/uL  Auto Eosinophil # : 0.1 K/uL  Auto Basophil # : 0.0 K/uL  Auto Neutrophil % : 55.0 %  Auto Lymphocyte % : 36.0 %  Auto Monocyte % : 7.6 %  Auto Eosinophil % : 1.1 %  Auto Basophil % : 0.3 %    11-07    142  |  107  |  11  ----------------------------<  89  3.8   |  24  |  0.72  11-07    141  |  106  |  12  ----------------------------<  88  3.9   |  24  |  0.70    Ca    8.4      07 Nov 2017 04:55  Ca    8.4      07 Nov 2017 04:54  Phos  3.0     11-07  Mg     2.0     11-07    TPro  6.0  /  Alb  3.8  /  TBili  0.5  /  DBili  x   /  AST  18  /  ALT  17  /  AlkPhos  51  11-07    TELEMETRY: 	  SR 50-70s     ECG:  	NSR 0.5 mm ST depressions V4, V5, V6 and inferior leads     PREVIOUS DIAGNOSTIC TESTING:    [ ] Echocardiogram: < from: Transthoracic Echocardiogram (11.08.17 @ 15:31) >  Dimensions:    Normal Values:  LA:     2.7    2.0 - 4.0 cm  Ao:     2.5    2.0 - 3.8 cm  SEPTUM: 0.6    0.6 - 1.2 cm  PWT:    0.8    0.6 - 1.1 cm  LVIDd:  3.9    3.0 - 5.6 cm  LVIDs:  2.5    1.8 - 4.0 cm  Derived variables:  LVMI: 51 g/m2  RWT: 0.41  Fractional short: 36 %  EF (Visual Estimate): 65 %  EF (Teicholtz): 66 %    Conclusions:  1. Normal left ventricular internal dimensions and wall  thicknesses.  2. Normal left ventricular systolic function. No segmental  wall motion abnormalities.  3. Normal diastolic function  4. Normal right ventricular size and function.  5. Estimated pulmonary artery systolic pressure equals 22  mm Hg, assuming right atrial pressure equals 8  mm Hg,  consistent with normal pulmonary pressures.  *** No previous Echo exam.  ---------------------------------------------        ASSESSMENT/PLAN: 	    62F w/HLD sinus bradycardia and essential tremor presenting with nausea/vomiting and lightheadedness on standing cardiology consulted for ST depressions on EKG with CP post stress test.     #EKG changes and CP post stress-- At baseline she has good exercise tolerance however notably had significant ST depressions during stress test. Former smoker and HLD.   - Consider CT Coronaries    #Sinus bradycardia--asymptomatic; Known prior history       [id number] Date of Admission: 11/6/17     Patient is a 62y old  Female who presents with a chief complaint of Nausea and dizziness (06 Nov 2017 15:41)    HISTORY OF PRESENT ILLNESS:     61 yo woman w/PMHx HLD, sinus bradycardia and essential tremor.  Presents with nausea/vomiting and lightheadedness on standing.  Cardiology consulted for ST depressions on EKG.   Symptoms of nausea/vomiting have improved. During this hospitalization, she underwent a TTE, which demonstrated normal LV function and no segmental wall motion abnormalities. After exercising on the treadmill, during nuclear stress she noted non-radiating 5/10 chest pain that resolved after 1 minute. She noted TREJO while walking on the treadmill. She reports that she regularly walks 20 blocks to work at least 3x a week without any exertional symptoms.      Allergies:  No Known Allergies    MEDICATIONS:  aspirin  chewable 81 milliGRAM(s) Oral daily  enoxaparin Injectable 40 milliGRAM(s) SubCutaneous daily  acetaminophen   Tablet. 650 milliGRAM(s) Oral every 6 hours PRN  ondansetron Injectable 4 milliGRAM(s) IV Push every 4 hours PRN  polyethylene glycol 3350 17 Gram(s) Oral daily PRN  sodium chloride 0.9%. 1000 milliLiter(s) IV Continuous <Continuous>    PAST MEDICAL & SURGICAL HISTORY:  No pertinent past medical history  No significant past surgical history    FAMILY HISTORY:    SOCIAL HISTORY:    Drinks wine 3-4x/week  Former Smoker Quit smoking 25 years ago, smoked for 7 years, 1/2 pk/day    REVIEW OF SYSTEMS:  CONSTITUTIONAL: No weakness, fevers or chills  EYES/ENT: No visual changes;  No dysphagia  NECK: No pain or stiffness  RESPIRATORY: No cough, wheezing, hemoptysis; No shortness of breath  CARDIOVASCULAR: No chest pain or palpitations; No lower extremity edema  GASTROINTESTINAL: No abdominal or epigastric pain. No nausea, vomiting, or hematemesis; No diarrhea or constipation. No melena or hematochezia.  BACK: No back pain  GENITOURINARY: No dysuria, frequency or hematuria  NEUROLOGICAL: No numbness or weakness  SKIN: No itching, burning, rashes, or lesions   All other review of systems is negative unless indicated above.    PHYSICAL EXAM:  T(C): 36.3 (11-08-17 @ 12:43), Max: 36.8 (11-08-17 @ 04:02)  HR: 61 (11-08-17 @ 12:43) (51 - 61)  BP: 120/76 (11-08-17 @ 12:43) (105/65 - 131/85)  RR: 17 (11-08-17 @ 12:43) (16 - 17)  SpO2: 98% (11-08-17 @ 12:43) (96% - 98%)    Appearance: Well appearing, pleasant  HEENT:   Normal oral mucosa, PERRL, EOMI	  Lymphatic: No lymphadenopathy  Cardiovascular: Normal S1 S2, No JVD, No murmurs, No edema  Respiratory: Lungs clear to auscultation	  Psychiatry: A & O x 3, Mood & affect appropriate  Gastrointestinal:  Soft, Non-tender, + BS	  Skin: No rashes, No ecchymoses, No cyanosis	  Neurologic: Non-focal  Extremities: Normal range of motion, No clubbing, cyanosis or edema  Vascular: Peripheral pulses palpable 2+ bilaterally      I&O's Summary  07 Nov 2017 07:01  -  08 Nov 2017 07:00  --------------------------------------------------------  IN: 240 mL / OUT: 300 mL / NET: -60 mL    08 Nov 2017 07:01  -  08 Nov 2017 15:13  --------------------------------------------------------  IN: 600 mL / OUT: 0 mL / NET: 600 mL      TELEMETRY: 	  SR 50-70s     ECG:  	NSR 0.5 mm ST depressions V4, V5, V6 and inferior leads     LABS:	 	    CBC Full  -  ( 07 Nov 2017 04:55 )  WBC Count : 5.1 K/uL  Hemoglobin : 12.7 g/dL  Hematocrit : 36.9 %  Platelet Count - Automated : 161 K/uL  Mean Cell Volume : 89.3 fl  Mean Cell Hemoglobin : 30.6 pg  Mean Cell Hemoglobin Concentration : 34.3 gm/dL  Auto Neutrophil # : 2.8 K/uL  Auto Lymphocyte # : 1.8 K/uL  Auto Monocyte # : 0.4 K/uL  Auto Eosinophil # : 0.1 K/uL  Auto Basophil # : 0.0 K/uL  Auto Neutrophil % : 55.0 %  Auto Lymphocyte % : 36.0 %  Auto Monocyte % : 7.6 %  Auto Eosinophil % : 1.1 %  Auto Basophil % : 0.3 %    11-07  142  |  107  |  11  ----------------------------<  89  3.8   |  24  |  0.72    11-07  141  |  106  |  12  ----------------------------<  88  3.9   |  24  |  0.70    Ca    8.4      07 Nov 2017 04:55  Ca    8.4      07 Nov 2017 04:54    Phos  3.0     11-07  Mg     2.0     11-07    TPro  6.0  /  Alb  3.8  /  TBili  0.5  /  DBili  x   /  AST  18  /  ALT  17  /  AlkPhos  51  11-07    Transthoracic Echocardiogram (11.08.17 @ 15:31) >  Dimensions:    Normal Values:  LA:     2.7    2.0 - 4.0 cm  Ao:     2.5    2.0 - 3.8 cm  SEPTUM: 0.6    0.6 - 1.2 cm  PWT:    0.8    0.6 - 1.1 cm  LVIDd:  3.9    3.0 - 5.6 cm  LVIDs:  2.5    1.8 - 4.0 cm  Derived variables:  LVMI: 51 g/m2  RWT: 0.41  Fractional short: 36 %  EF (Visual Estimate): 65 %  EF (Teicholtz): 66 %    Conclusions:  1. Normal left ventricular internal dimensions and wall thicknesses.  2. Normal left ventricular systolic function. No segmental wall motion abnormalities.  3. Normal diastolic function  4. Normal right ventricular size and function.  5. Estimated pulmonary artery systolic pressure equals 22 mm Hg, assuming right atrial pressure equals 8  mm Hg, consistent with normal pulmonary pressures.      ASSESSMENT/PLAN: 	    62F w/HLD, sinus bradycardia and essential tremor, presenting with nausea/vomiting and lightheadedness on standing.  Cardiology consulted for ST depressions on EKG with CP post stress test.     #EKG changes and CP post stress-- At baseline she has good exercise tolerance however notably had significant ST depressions during stress test. Former smoker and HLD.   - Consider CT Coronaries    #Sinus bradycardia--asymptomatic; Known prior history       BARBARA Duarte M.D.  Cardiology fellow  [id number] Date of Admission: 11/6/17     61 yo woman w/PMHx HLD, sinus bradycardia and essential tremor.  Seen at urgent care center, presenting with complaint of nausea/vomiting x 72 hours.  EKG done during evaluation, and mild ST/T abnormilities seen; patient was referred to ER.  Symptoms of nausea/vomiting have improved. During this hospitalization, a TTE, demonstrated normal LV function and no segmental wall motion abnormalities.  Exercise test with isotope imaging done today.  During exercise, accentuation of ST/T changes noted; experienced vague CP after exercising on the treadmill, which resolved after 1 minute. Perfusion scans reported to be unremarkable.  She reports that she regularly walks 20 blocks to work at least 3x a week without any exertional symptoms.  No known hx. of heart disease.    Allergies:  No Known Allergies    MEDICATIONS:  aspirin  chewable 81 milliGRAM(s) Oral daily  enoxaparin Injectable 40 milliGRAM(s) SubCutaneous daily  acetaminophen   Tablet. 650 milliGRAM(s) Oral every 6 hours PRN  ondansetron Injectable 4 milliGRAM(s) IV Push every 4 hours PRN  polyethylene glycol 3350 17 Gram(s) Oral daily PRN  sodium chloride 0.9%. 1000 milliLiter(s) IV Continuous <Continuous>    PAST MEDICAL & SURGICAL HISTORY:  No pertinent past medical history  No significant past surgical history    FAMILY HISTORY:  No family history of heart disease.    SOCIAL HISTORY:    Drinks wine 3-4x/week  Former Smoker, quit smoking 25 years ago -- smoked for 7 years, 1/2 pk/day    REVIEW OF SYSTEMS:  CONSTITUTIONAL: No weakness, fevers or chills  EYES/ENT: No visual changes;  No dysphagia  NECK: No pain or stiffness  RESPIRATORY: No cough, wheezing, hemoptysis; No shortness of breath  CARDIOVASCULAR: No chest pain or palpitations; No lower extremity edema  GASTROINTESTINAL: No diarrhea or constipation. No melena or hematochezia.  BACK: No back pain  GENITOURINARY: No dysuria, frequency or hematuria  NEUROLOGICAL: No numbness or weakness  SKIN: No itching, burning, rashes, or lesions   All other review of systems is negative unless indicated above.    PHYSICAL EXAM:  T(C): 36.3 (11-08-17 @ 12:43), Max: 36.8 (11-08-17 @ 04:02)  HR: 61 (11-08-17 @ 12:43) (51 - 61)  BP: 120/76 (11-08-17 @ 12:43) (105/65 - 131/85)  RR: 17 (11-08-17 @ 12:43) (16 - 17)  SpO2: 98% (11-08-17 @ 12:43) (96% - 98%)    Appearance: Well appearing, pleasant  HEENT:   Normal oral mucosa, PERRL, EOMI	  Lymphatic: No lymphadenopathy  Cardiovascular: Normal S1 S2, No JVD, No murmurs, No edema  Respiratory: Lungs clear to auscultation	  Psychiatry: A & O x 3, Mood & affect appropriate  Gastrointestinal:  Soft, Non-tender, + BS	  Skin: No rashes, No ecchymoses, No cyanosis	  Neurologic: Non-focal  Extremities: Normal range of motion, No clubbing, cyanosis or edema  Vascular: Peripheral pulses palpable 2+ bilaterally      I&O's Summary  07 Nov 2017 07:01  -  08 Nov 2017 07:00  --------------------------------------------------------  IN: 240 mL / OUT: 300 mL / NET: -60 mL    08 Nov 2017 07:01  -  08 Nov 2017 15:13  --------------------------------------------------------  IN: 600 mL / OUT: 0 mL / NET: 600 mL      TELEMETRY: 	  SR 50-70s     ECG:  	NSR 0.5 mm ST depressions V4, V5, V6 and inferior leads     LABS:	 	    CBC Full  -  ( 07 Nov 2017 04:55 )  WBC Count : 5.1 K/uL  Hemoglobin : 12.7 g/dL  Hematocrit : 36.9 %  Platelet Count - Automated : 161 K/uL  Mean Cell Volume : 89.3 fl  Mean Cell Hemoglobin : 30.6 pg  Mean Cell Hemoglobin Concentration : 34.3 gm/dL  Auto Neutrophil # : 2.8 K/uL  Auto Lymphocyte # : 1.8 K/uL  Auto Monocyte # : 0.4 K/uL  Auto Eosinophil # : 0.1 K/uL  Auto Basophil # : 0.0 K/uL  Auto Neutrophil % : 55.0 %  Auto Lymphocyte % : 36.0 %  Auto Monocyte % : 7.6 %  Auto Eosinophil % : 1.1 %  Auto Basophil % : 0.3 %    11-07  142  |  107  |  11  ----------------------------<  89  3.8   |  24  |  0.72    11-07  141  |  106  |  12  ----------------------------<  88  3.9   |  24  |  0.70    Ca    8.4      07 Nov 2017 04:55  Ca    8.4      07 Nov 2017 04:54    Phos  3.0     11-07  Mg     2.0     11-07    TPro  6.0  /  Alb  3.8  /  TBili  0.5  /  DBili  x   /  AST  18  /  ALT  17  /  AlkPhos  51  11-07    Transthoracic Echocardiogram (11.08.17 @ 15:31) >  Dimensions:    Normal Values:  LA:     2.7    2.0 - 4.0 cm  Ao:     2.5    2.0 - 3.8 cm  SEPTUM: 0.6    0.6 - 1.2 cm  PWT:    0.8    0.6 - 1.1 cm  LVIDd:  3.9    3.0 - 5.6 cm  LVIDs:  2.5    1.8 - 4.0 cm  Derived variables:  LVMI: 51 g/m2  RWT: 0.41  Fractional short: 36 %  EF (Visual Estimate): 65 %  EF (Teicholtz): 66 %        Conclusions:  1. Normal left ventricular internal dimensions and wall thicknesses.  2. Normal left ventricular systolic function. No segmental wall motion abnormalities.  3. Normal diastolic function  4. Normal right ventricular size and function.  5. Estimated pulmonary artery systolic pressure equals 22 mm Hg, assuming right atrial pressure equals 8  mm Hg, consistent with normal pulmonary pressures.      ASSESSMENT/PLAN: 	    62F w/HLD, sinus bradycardia and essential tremor, presenting with nausea/vomiting and lightheadedness on standing.  Referred to ER due to mild ST/T abn on EKG.  Stress isotope study report not yet available; discussed with stress lab - reported to show accentuation of baseline changes; no abnormality described on perfusion scans.     #No evidence of ischemia on perfusion scan; no further w/u warranted at this time.  No objection to discharge.    #Sinus bradycardia--asymptomatic, documented previously.  Likely due to asymptomatic sinus node dysfunction or increased vagal tone.       BARBARA Duarte M.D.  Cardiology fellow  [phone]

## 2017-11-08 NOTE — PROGRESS NOTE ADULT - SUBJECTIVE AND OBJECTIVE BOX
Patient is a 62y old  Female who presents with a chief complaint of Nausea and dizziness (06 Nov 2017 15:41)    SUBJECTIVE / OVERNIGHT EVENTS: no acute events overnight    MEDICATIONS  (STANDING):  aspirin  chewable 81 milliGRAM(s) Oral daily  enoxaparin Injectable 40 milliGRAM(s) SubCutaneous daily  sodium chloride 0.9%. 1000 milliLiter(s) (80 mL/Hr) IV Continuous <Continuous>    MEDICATIONS  (PRN):  acetaminophen   Tablet. 650 milliGRAM(s) Oral every 6 hours PRN Mild Pain (1 - 3)  ondansetron Injectable 4 milliGRAM(s) IV Push every 4 hours PRN Nausea and/or Vomiting  polyethylene glycol 3350 17 Gram(s) Oral daily PRN Constipation      Vital Signs Last 24 Hrs  T(C): 36.8 (08 Nov 2017 04:02), Max: 36.8 (08 Nov 2017 04:02)  T(F): 98.3 (08 Nov 2017 04:02), Max: 98.3 (08 Nov 2017 04:02)  HR: 51 (08 Nov 2017 04:02) (51 - 59)  BP: 105/65 (08 Nov 2017 04:02) (105/65 - 131/85)  BP(mean): --  RR: 17 (08 Nov 2017 04:02) (16 - 17)  SpO2: 96% (08 Nov 2017 04:02) (96% - 97%)  CAPILLARY BLOOD GLUCOSE        I&O's Summary    07 Nov 2017 07:01  -  08 Nov 2017 07:00  --------------------------------------------------------  IN: 240 mL / OUT: 300 mL / NET: -60 mL        PHYSICAL EXAM:  GENERAL: NAD, well-developed  HEAD:  Atraumatic, Normocephalic  EYES: EOMI, PERRLA, conjunctiva and sclera clear  NECK: Supple, No JVD  CHEST/LUNG: Clear to auscultation bilaterally; No wheeze  HEART: Regular rate and rhythm; No murmurs, rubs, or gallops  ABDOMEN: Soft, Nontender, Nondistended; Bowel sounds present  EXTREMITIES:  2+ Peripheral Pulses, No clubbing, cyanosis, or edema  PSYCH: AAOx3  NEUROLOGY: non-focal  SKIN: No rashes or lesions    LABS:                        12.7   5.1   )-----------( 161      ( 07 Nov 2017 04:55 )             36.9     11-07    142  |  107  |  11  ----------------------------<  89  3.8   |  24  |  0.72    Ca    8.4      07 Nov 2017 04:55  Phos  3.0     11-07  Mg     2.0     11-07    TPro  6.0  /  Alb  3.8  /  TBili  0.5  /  DBili  x   /  AST  18  /  ALT  17  /  AlkPhos  51  11-07    PT/INR - ( 06 Nov 2017 12:18 )   PT: 12.6 sec;   INR: 1.15 ratio         PTT - ( 06 Nov 2017 12:18 )  PTT:34.7 sec  CARDIAC MARKERS ( 07 Nov 2017 00:07 )  x     / <0.01 ng/mL / 69 U/L / x     / 1.6 ng/mL  CARDIAC MARKERS ( 06 Nov 2017 16:43 )  x     / <0.01 ng/mL / 81 U/L / x     / 1.3 ng/mL  CARDIAC MARKERS ( 06 Nov 2017 12:18 )  x     / <0.01 ng/mL / x     / x     / x          Urinalysis Basic - ( 07 Nov 2017 22:42 )    Color: x / Appearance: x / SG: x / pH: x  Gluc: x / Ketone: x  / Bili: x / Urobili: x   Blood: x / Protein: x / Nitrite: x   Leuk Esterase: x / RBC: 0-2 /HPF / WBC 5-10 /HPF   Sq Epi: x / Non Sq Epi: x / Bacteria: x        RADIOLOGY & ADDITIONAL TESTS:    Imaging Personally Reviewed:    Consultant(s) Notes Reviewed:      Care Discussed with Consultants/Other Providers: Patient is a 62y old  Female who presents with a chief complaint of Nausea and dizziness (06 Nov 2017 15:41)    SUBJECTIVE / OVERNIGHT EVENTS: no acute events overnight. Nausea has resolved.     MEDICATIONS  (STANDING):  aspirin  chewable 81 milliGRAM(s) Oral daily  enoxaparin Injectable 40 milliGRAM(s) SubCutaneous daily  sodium chloride 0.9%. 1000 milliLiter(s) (80 mL/Hr) IV Continuous <Continuous>    MEDICATIONS  (PRN):  acetaminophen   Tablet. 650 milliGRAM(s) Oral every 6 hours PRN Mild Pain (1 - 3)  ondansetron Injectable 4 milliGRAM(s) IV Push every 4 hours PRN Nausea and/or Vomiting  polyethylene glycol 3350 17 Gram(s) Oral daily PRN Constipation      Vital Signs Last 24 Hrs  T(C): 36.8 (08 Nov 2017 04:02), Max: 36.8 (08 Nov 2017 04:02)  T(F): 98.3 (08 Nov 2017 04:02), Max: 98.3 (08 Nov 2017 04:02)  HR: 51 (08 Nov 2017 04:02) (51 - 59)  BP: 105/65 (08 Nov 2017 04:02) (105/65 - 131/85)  BP(mean): --  RR: 17 (08 Nov 2017 04:02) (16 - 17)  SpO2: 96% (08 Nov 2017 04:02) (96% - 97%)  CAPILLARY BLOOD GLUCOSE        I&O's Summary    07 Nov 2017 07:01  -  08 Nov 2017 07:00  --------------------------------------------------------  IN: 240 mL / OUT: 300 mL / NET: -60 mL        PHYSICAL EXAM:  GENERAL: NAD, well-developed  HEAD:  Atraumatic, Normocephalic  EYES: EOMI, PERRLA, conjunctiva and sclera clear  NECK: Supple, No JVD  CHEST/LUNG: Clear to auscultation bilaterally; No wheeze  HEART: Regular rate and rhythm; No murmurs, rubs, or gallops  ABDOMEN: Soft, Nontender, Nondistended; Bowel sounds present  EXTREMITIES:  2+ Peripheral Pulses, No clubbing, cyanosis, or edema  PSYCH: AAOx3  NEUROLOGY: non-focal  SKIN: No rashes or lesions    LABS:                        12.7   5.1   )-----------( 161      ( 07 Nov 2017 04:55 )             36.9     11-07    142  |  107  |  11  ----------------------------<  89  3.8   |  24  |  0.72    Ca    8.4      07 Nov 2017 04:55  Phos  3.0     11-07  Mg     2.0     11-07    TPro  6.0  /  Alb  3.8  /  TBili  0.5  /  DBili  x   /  AST  18  /  ALT  17  /  AlkPhos  51  11-07    PT/INR - ( 06 Nov 2017 12:18 )   PT: 12.6 sec;   INR: 1.15 ratio         PTT - ( 06 Nov 2017 12:18 )  PTT:34.7 sec  CARDIAC MARKERS ( 07 Nov 2017 00:07 )  x     / <0.01 ng/mL / 69 U/L / x     / 1.6 ng/mL  CARDIAC MARKERS ( 06 Nov 2017 16:43 )  x     / <0.01 ng/mL / 81 U/L / x     / 1.3 ng/mL  CARDIAC MARKERS ( 06 Nov 2017 12:18 )  x     / <0.01 ng/mL / x     / x     / x          Urinalysis Basic - ( 07 Nov 2017 22:42 )    Color: x / Appearance: x / SG: x / pH: x  Gluc: x / Ketone: x  / Bili: x / Urobili: x   Blood: x / Protein: x / Nitrite: x   Leuk Esterase: x / RBC: 0-2 /HPF / WBC 5-10 /HPF   Sq Epi: x / Non Sq Epi: x / Bacteria: x        RADIOLOGY & ADDITIONAL TESTS:    Imaging Personally Reviewed:    Consultant(s) Notes Reviewed:      Care Discussed with Consultants/Other Providers:

## 2017-11-08 NOTE — PROGRESS NOTE ADULT - PROBLEM SELECTOR PLAN 3
-asymptomatic, occurred at night, HR to 40s the first night that she was here   -continue tele monitoring
-asymptomatic, occurred at night, HR to 40s  -continue tele monitoring

## 2017-11-08 NOTE — CONSULT NOTE ADULT - NSHPATTENDINGPLANDISCUSS_GEN_ALL_CORE
Patient seen and examined; discussed with cardiology fellow. Hx., exam and labs as above.  I agree with the assessment and recommendations.                                         Curtis Ching M.D.  Cardiology Consult Service  [phone] or [phone]

## 2017-11-08 NOTE — DISCHARGE NOTE ADULT - HOSPITAL COURSE
62 year old female with hx of essential tremor presents with symptoms of nausea, vomiting, dizziness, lightheadedness, decreased appetite that began Saturday morning, found to have ST/T changes on EKG.  Pt was admitted to Telemetry.  Cardiac enzymes were negative. HR while on telemetry was 40’s – 60 bpm with the lowest HR at 41 bpm.  Pt was asymptomatic.  Echo was negative with an EF: 66%.  Pt also had an Exercise Stress Test done.  Results were reviewed by Cardiology.  Pt with no further symptoms of bradycardia.  She will be discharged to home today with her .  She will follow up with Dr. Phillip Padgett next week.  Discharge plans and medication reconciliation reviewed with patient and her .  Both verbalize understanding. 62 year old female with hx of essential tremor presents with symptoms of nausea, vomiting, dizziness, lightheadedness, decreased appetite that began Saturday morning, found to have ST/T changes on EKG.  Pt was admitted to Telemetry.  Cardiac enzymes were negative. HR while on telemetry was 40’s – 60 bpm with the lowest HR at 41 bpm.  Pt was asymptomatic.  Echo was negative with an EF: 66%.  Pt also had an Exercise Stress Test done.  Results were reviewed by Cardiology, no ischemia seen.  Pt with no further symptoms of bradycardia.  The bradycardia she had originally was determined to be from increased vagal tone, as it was during sleeping and she was asymptomatic. She will be discharged to home today with her .  She will follow up with Dr. Phillip Padgett next week.  Discharge plans and medication reconciliation reviewed with patient and her .  Both verbalize understanding.

## 2017-11-08 NOTE — DISCHARGE NOTE ADULT - MEDICATION SUMMARY - MEDICATIONS TO TAKE
I will START or STAY ON the medications listed below when I get home from the hospital:    Advil oral tablet  -- 2 tab(s) by mouth , As Needed  -- Indication: For Pain     aspirin 81 mg oral tablet, chewable  -- 1 tab(s) by mouth once a day  -- Indication: For CAD

## 2017-11-08 NOTE — DISCHARGE NOTE ADULT - CARE PLAN
Principal Discharge DX:	Bradycardia  Goal:	No further episode of bradycardia will occur.  Instructions for follow-up, activity and diet:	Continue baby aspirin daily.   Follow up with Dr. Phillip Padgett next week.

## 2017-11-08 NOTE — PROGRESS NOTE ADULT - PROBLEM SELECTOR PROBLEM 1
Nausea and vomiting, intractability of vomiting not specified, unspecified vomiting type
Nausea and vomiting, intractability of vomiting not specified, unspecified vomiting type

## 2017-11-08 NOTE — DIETITIAN INITIAL EVALUATION ADULT. - OTHER INFO
seen for nausea/vomit >3 days PTA. states no nausea/vomit since admission-orders for zofran. states tolerating 100% of Regular diet. states plan for echocardiogram today due to possible nausea vomit related to the heart. last BM yesterday-states they are loose. NKFA

## 2017-11-08 NOTE — PROGRESS NOTE ADULT - ASSESSMENT
62 year old female with hx of essential tremor presents with symptoms of nausea, vomiting, dizziness, lightheadedness, decreased appetite that began saturday morning, found to have ST/T changes on EKG
62 year old female with hx of essential tremor presents with symptoms of nausea, vomiting, dizziness, lightheadedness, decreased appetite that began saturday morning, found to have ST/T changes on EKG

## 2017-11-08 NOTE — DISCHARGE NOTE ADULT - PATIENT PORTAL LINK FT
“You can access the [facility] Patient Portal, offered by [facility], by registering with the following website: [email or web address]”

## 2017-11-08 NOTE — DISCHARGE NOTE ADULT - CARE PROVIDER_API CALL
Phillip Padgett (MD), Cardiovascular Disease; Internal Medicine  [address] [place] [address] [id number]  Phone: [phone]  Fax: [phone]

## 2017-11-08 NOTE — DISCHARGE NOTE ADULT - PLAN OF CARE
No further episode of bradycardia will occur. Continue baby aspirin daily.   Follow up with Dr. Phillip Padgett next week.

## 2017-11-13 ENCOUNTER — APPOINTMENT (OUTPATIENT)
Dept: INTERNAL MEDICINE | Facility: CLINIC | Age: 62
End: 2017-11-13
Payer: MEDICAID

## 2017-11-13 VITALS
OXYGEN SATURATION: 98 % | HEIGHT: 65 IN | HEART RATE: 63 BPM | SYSTOLIC BLOOD PRESSURE: 118 MMHG | DIASTOLIC BLOOD PRESSURE: 70 MMHG | TEMPERATURE: 98.1 F | WEIGHT: 114 LBS | BODY MASS INDEX: 18.99 KG/M2

## 2017-11-13 VITALS — SYSTOLIC BLOOD PRESSURE: 130 MMHG | DIASTOLIC BLOOD PRESSURE: 70 MMHG

## 2017-11-13 DIAGNOSIS — R11.2 NAUSEA WITH VOMITING, UNSPECIFIED: ICD-10-CM

## 2017-11-13 DIAGNOSIS — R94.31 ABNORMAL ELECTROCARDIOGRAM [ECG] [EKG]: ICD-10-CM

## 2017-11-13 PROCEDURE — 99496 TRANSJ CARE MGMT HIGH F2F 7D: CPT

## 2017-11-13 RX ORDER — ALPRAZOLAM 0.25 MG/1
0.25 TABLET ORAL DAILY
Qty: 30 | Refills: 0 | Status: COMPLETED | COMMUNITY
Start: 2017-11-13

## 2017-12-06 ENCOUNTER — APPOINTMENT (OUTPATIENT)
Dept: INTERNAL MEDICINE | Facility: CLINIC | Age: 62
End: 2017-12-06
Payer: MEDICAID

## 2017-12-06 ENCOUNTER — NON-APPOINTMENT (OUTPATIENT)
Age: 62
End: 2017-12-06

## 2017-12-06 VITALS — SYSTOLIC BLOOD PRESSURE: 130 MMHG | DIASTOLIC BLOOD PRESSURE: 80 MMHG

## 2017-12-06 VITALS
DIASTOLIC BLOOD PRESSURE: 76 MMHG | TEMPERATURE: 97.3 F | SYSTOLIC BLOOD PRESSURE: 110 MMHG | WEIGHT: 114 LBS | BODY MASS INDEX: 20.98 KG/M2 | HEIGHT: 62 IN

## 2017-12-06 DIAGNOSIS — R42 DIZZINESS AND GIDDINESS: ICD-10-CM

## 2017-12-06 PROCEDURE — 99396 PREV VISIT EST AGE 40-64: CPT | Mod: 25

## 2017-12-06 PROCEDURE — 93000 ELECTROCARDIOGRAM COMPLETE: CPT

## 2017-12-06 PROCEDURE — 94010 BREATHING CAPACITY TEST: CPT

## 2017-12-06 PROCEDURE — 36415 COLL VENOUS BLD VENIPUNCTURE: CPT

## 2017-12-07 LAB
ALBUMIN SERPL ELPH-MCNC: 4.4 G/DL
ALP BLD-CCNC: 53 U/L
ALT SERPL-CCNC: 30 U/L
ANION GAP SERPL CALC-SCNC: 13 MMOL/L
APPEARANCE: CLEAR
AST SERPL-CCNC: 27 U/L
BACTERIA: NEGATIVE
BASOPHILS # BLD AUTO: 0.02 K/UL
BASOPHILS NFR BLD AUTO: 0.4 %
BILIRUB SERPL-MCNC: 0.4 MG/DL
BILIRUBIN URINE: NEGATIVE
BLOOD URINE: NEGATIVE
BUN SERPL-MCNC: 14 MG/DL
CALCIUM SERPL-MCNC: 9.4 MG/DL
CHLORIDE SERPL-SCNC: 102 MMOL/L
CHOLEST SERPL-MCNC: 229 MG/DL
CHOLEST/HDLC SERPL: 3.3 RATIO
CO2 SERPL-SCNC: 25 MMOL/L
COLOR: YELLOW
CREAT SERPL-MCNC: 0.71 MG/DL
EOSINOPHIL # BLD AUTO: 0.08 K/UL
EOSINOPHIL NFR BLD AUTO: 1.6 %
ERYTHROCYTE [SEDIMENTATION RATE] IN BLOOD BY WESTERGREN METHOD: 8 MM/HR
GLUCOSE QUALITATIVE U: NEGATIVE MG/DL
GLUCOSE SERPL-MCNC: 91 MG/DL
HBA1C MFR BLD HPLC: 5.7 %
HCT VFR BLD CALC: 40.3 %
HDLC SERPL-MCNC: 70 MG/DL
HGB BLD-MCNC: 12.7 G/DL
HYALINE CASTS: 1 /LPF
IMM GRANULOCYTES NFR BLD AUTO: 0.2 %
KETONES URINE: NEGATIVE
LDLC SERPL CALC-MCNC: 140 MG/DL
LDLC SERPL DIRECT ASSAY-MCNC: 148 MG/DL
LEUKOCYTE ESTERASE URINE: ABNORMAL
LYMPHOCYTES # BLD AUTO: 1.86 K/UL
LYMPHOCYTES NFR BLD AUTO: 36.1 %
MAN DIFF?: NORMAL
MCHC RBC-ENTMCNC: 28.1 PG
MCHC RBC-ENTMCNC: 31.5 GM/DL
MCV RBC AUTO: 89.2 FL
MICROSCOPIC-UA: NORMAL
MONOCYTES # BLD AUTO: 0.4 K/UL
MONOCYTES NFR BLD AUTO: 7.8 %
NEUTROPHILS # BLD AUTO: 2.78 K/UL
NEUTROPHILS NFR BLD AUTO: 53.9 %
NITRITE URINE: NEGATIVE
PH URINE: 6
PLATELET # BLD AUTO: 191 K/UL
POTASSIUM SERPL-SCNC: 4.7 MMOL/L
PROT SERPL-MCNC: 7 G/DL
PROTEIN URINE: NEGATIVE MG/DL
RBC # BLD: 4.52 M/UL
RBC # FLD: 13.4 %
RED BLOOD CELLS URINE: 1 /HPF
SAVE SPECIMEN: NORMAL
SODIUM SERPL-SCNC: 140 MMOL/L
SPECIFIC GRAVITY URINE: 1.01
SQUAMOUS EPITHELIAL CELLS: 1 /HPF
T4 FREE SERPL-MCNC: 1 NG/DL
TRIGL SERPL-MCNC: 96 MG/DL
TSH SERPL-ACNC: 4.29 UIU/ML
UROBILINOGEN URINE: NEGATIVE MG/DL
WBC # FLD AUTO: 5.15 K/UL
WHITE BLOOD CELLS URINE: 1 /HPF

## 2018-05-04 ENCOUNTER — TRANSCRIPTION ENCOUNTER (OUTPATIENT)
Age: 63
End: 2018-05-04

## 2018-09-13 ENCOUNTER — LABORATORY RESULT (OUTPATIENT)
Age: 63
End: 2018-09-13

## 2018-09-13 ENCOUNTER — APPOINTMENT (OUTPATIENT)
Dept: OBGYN | Facility: CLINIC | Age: 63
End: 2018-09-13
Payer: MEDICAID

## 2018-09-13 ENCOUNTER — OUTPATIENT (OUTPATIENT)
Dept: OUTPATIENT SERVICES | Facility: HOSPITAL | Age: 63
LOS: 1 days | End: 2018-09-13
Payer: MEDICAID

## 2018-09-13 VITALS — SYSTOLIC BLOOD PRESSURE: 110 MMHG | DIASTOLIC BLOOD PRESSURE: 72 MMHG | WEIGHT: 116 LBS | BODY MASS INDEX: 21.22 KG/M2

## 2018-09-13 DIAGNOSIS — N76.0 ACUTE VAGINITIS: ICD-10-CM

## 2018-09-13 PROCEDURE — 87491 CHLMYD TRACH DNA AMP PROBE: CPT

## 2018-09-13 PROCEDURE — G0463: CPT

## 2018-09-13 PROCEDURE — 87591 N.GONORRHOEAE DNA AMP PROB: CPT

## 2018-09-13 PROCEDURE — 99213 OFFICE O/P EST LOW 20 MIN: CPT | Mod: NC

## 2018-09-13 PROCEDURE — 87624 HPV HI-RISK TYP POOLED RSLT: CPT

## 2018-09-14 LAB
C TRACH RRNA SPEC QL NAA+PROBE: SIGNIFICANT CHANGE UP
HPV HIGH+LOW RISK DNA PNL CVX: SIGNIFICANT CHANGE UP
N GONORRHOEA RRNA SPEC QL NAA+PROBE: SIGNIFICANT CHANGE UP
SPECIMEN SOURCE: SIGNIFICANT CHANGE UP

## 2018-09-18 LAB — CYTOLOGY SPEC DOC CYTO: SIGNIFICANT CHANGE UP

## 2018-09-24 DIAGNOSIS — M85.80 OTHER SPECIFIED DISORDERS OF BONE DENSITY AND STRUCTURE, UNSPECIFIED SITE: ICD-10-CM

## 2018-12-10 ENCOUNTER — APPOINTMENT (OUTPATIENT)
Dept: INTERNAL MEDICINE | Facility: CLINIC | Age: 63
End: 2018-12-10
Payer: MEDICAID

## 2018-12-10 VITALS
HEIGHT: 61 IN | SYSTOLIC BLOOD PRESSURE: 118 MMHG | BODY MASS INDEX: 22.09 KG/M2 | DIASTOLIC BLOOD PRESSURE: 78 MMHG | TEMPERATURE: 97.5 F | WEIGHT: 117 LBS

## 2018-12-10 PROCEDURE — G0008: CPT

## 2018-12-10 PROCEDURE — 99396 PREV VISIT EST AGE 40-64: CPT | Mod: 25

## 2018-12-10 PROCEDURE — 93000 ELECTROCARDIOGRAM COMPLETE: CPT

## 2018-12-10 PROCEDURE — 36415 COLL VENOUS BLD VENIPUNCTURE: CPT

## 2018-12-10 PROCEDURE — 90674 CCIIV4 VAC NO PRSV 0.5 ML IM: CPT

## 2018-12-10 PROCEDURE — 94010 BREATHING CAPACITY TEST: CPT

## 2018-12-10 PROCEDURE — 71046 X-RAY EXAM CHEST 2 VIEWS: CPT

## 2018-12-10 NOTE — ASSESSMENT
[FreeTextEntry1] : \par \par ecg---SR; shor APT; otherwise WNL\par pfts---"restriction"; probably technical\par cxr pa/lat---NPAD\par \par imp--Meniere's disease--sporadic occurrence\par         hyperlipidemia---moderately elevated LDL cholesterol\par         impaired glucose tolerance---last A1C=5.7\par \par plan---FBW per CPE\par           continue current meds\par           flu vaccine administered\par           annual CPE

## 2018-12-10 NOTE — HISTORY OF PRESENT ILLNESS
[FreeTextEntry1] : here for comprehensive [de-identified] : \par generally well;\par 2 or 3 episodes of vertigo in Oct.; none since;\par takes meclizine ...inconsistent response\par \par

## 2018-12-10 NOTE — HEALTH RISK ASSESSMENT
[Very Good] : ~his/her~  mood as very good [No falls in past year] : Patient reported no falls in the past year [None] : None [With Family] : lives with family [Unemployed] : unemployed [] :  [# Of Children ___] : has [unfilled] children [Fully functional (bathing, dressing, toileting, transferring, walking, feeding)] : Fully functional (bathing, dressing, toileting, transferring, walking, feeding) [Fully functional (using the telephone, shopping, preparing meals, housekeeping, doing laundry, using] : Fully functional and needs no help or supervision to perform IADLs (using the telephone, shopping, preparing meals, housekeeping, doing laundry, using transportation, managing medications and managing finances) [Discussed at today's visit] : Advance Directives Discussed at today's visit [] : No [de-identified] : none [de-identified] : exposed to second hand smoke ( smokes cigs.) [de-identified] : occ. red wine [[id number]] : 0 [Change in mental status noted] : No change in mental status noted [Language] : denies difficulty with language [Behavior] : denies difficulty with behavior [Learning/Retaining New Information] : denies difficulty learning/retaining new information [Handling Complex Tasks] : denies difficulty handling complex tasks [Reasoning] : denies difficulty with reasoning [Spatial Ability and Orientation] : denies difficulty with spatial ability and orientation [Reports changes in hearing] : Reports no changes in hearing [Reports changes in vision] : Reports no changes in vision [Reports changes in dental health] : Reports no changes in dental health [MammogramDate] : 11/18 [BoneDensityDate] : 11/18 [ColonoscopyDate] : 11/15

## 2018-12-11 LAB
ALBUMIN SERPL ELPH-MCNC: 4.4 G/DL
ALP BLD-CCNC: 53 U/L
ALT SERPL-CCNC: 18 U/L
ANION GAP SERPL CALC-SCNC: 14 MMOL/L
AST SERPL-CCNC: 23 U/L
BASOPHILS # BLD AUTO: 0.01 K/UL
BASOPHILS NFR BLD AUTO: 0.2 %
BILIRUB SERPL-MCNC: 0.3 MG/DL
BUN SERPL-MCNC: 16 MG/DL
CALCIUM SERPL-MCNC: 9.2 MG/DL
CHLORIDE SERPL-SCNC: 104 MMOL/L
CHOLEST SERPL-MCNC: 214 MG/DL
CHOLEST/HDLC SERPL: 3.5 RATIO
CO2 SERPL-SCNC: 22 MMOL/L
CREAT SERPL-MCNC: 0.76 MG/DL
EOSINOPHIL # BLD AUTO: 0.06 K/UL
EOSINOPHIL NFR BLD AUTO: 1.3 %
ERYTHROCYTE [SEDIMENTATION RATE] IN BLOOD BY WESTERGREN METHOD: 2 MM/HR
GLUCOSE SERPL-MCNC: 101 MG/DL
HBA1C MFR BLD HPLC: 5.8 %
HCT VFR BLD CALC: 39.2 %
HDLC SERPL-MCNC: 62 MG/DL
HGB BLD-MCNC: 12.3 G/DL
IMM GRANULOCYTES NFR BLD AUTO: 0.2 %
LDLC SERPL CALC-MCNC: 138 MG/DL
LDLC SERPL DIRECT ASSAY-MCNC: 145 MG/DL
LYMPHOCYTES # BLD AUTO: 1.61 K/UL
LYMPHOCYTES NFR BLD AUTO: 35.6 %
MAN DIFF?: NORMAL
MCHC RBC-ENTMCNC: 27.5 PG
MCHC RBC-ENTMCNC: 31.4 GM/DL
MCV RBC AUTO: 87.5 FL
MONOCYTES # BLD AUTO: 0.3 K/UL
MONOCYTES NFR BLD AUTO: 6.6 %
NEUTROPHILS # BLD AUTO: 2.53 K/UL
NEUTROPHILS NFR BLD AUTO: 56.1 %
PLATELET # BLD AUTO: 197 K/UL
POTASSIUM SERPL-SCNC: 4.6 MMOL/L
PROT SERPL-MCNC: 6.9 G/DL
RBC # BLD: 4.48 M/UL
RBC # FLD: 14 %
SAVE SPECIMEN: NORMAL
SODIUM SERPL-SCNC: 140 MMOL/L
T4 FREE SERPL-MCNC: 1.2 NG/DL
TRIGL SERPL-MCNC: 69 MG/DL
TSH SERPL-ACNC: 3.05 UIU/ML
WBC # FLD AUTO: 4.52 K/UL

## 2019-05-07 ENCOUNTER — TRANSCRIPTION ENCOUNTER (OUTPATIENT)
Age: 64
End: 2019-05-07

## 2019-06-01 ENCOUNTER — TRANSCRIPTION ENCOUNTER (OUTPATIENT)
Age: 64
End: 2019-06-01

## 2019-12-16 ENCOUNTER — APPOINTMENT (OUTPATIENT)
Dept: INTERNAL MEDICINE | Facility: CLINIC | Age: 64
End: 2019-12-16
Payer: MEDICAID

## 2019-12-16 VITALS
TEMPERATURE: 98.7 F | WEIGHT: 121 LBS | SYSTOLIC BLOOD PRESSURE: 110 MMHG | HEIGHT: 62 IN | BODY MASS INDEX: 22.26 KG/M2 | DIASTOLIC BLOOD PRESSURE: 70 MMHG

## 2019-12-16 DIAGNOSIS — Z23 ENCOUNTER FOR IMMUNIZATION: ICD-10-CM

## 2019-12-16 PROCEDURE — 93000 ELECTROCARDIOGRAM COMPLETE: CPT

## 2019-12-16 PROCEDURE — 94010 BREATHING CAPACITY TEST: CPT

## 2019-12-16 PROCEDURE — 99396 PREV VISIT EST AGE 40-64: CPT | Mod: 25

## 2019-12-16 PROCEDURE — G0008: CPT

## 2019-12-16 PROCEDURE — 71046 X-RAY EXAM CHEST 2 VIEWS: CPT

## 2019-12-16 PROCEDURE — 90682 RIV4 VACC RECOMBINANT DNA IM: CPT

## 2019-12-16 RX ORDER — ALPRAZOLAM 0.25 MG/1
0.25 TABLET ORAL
Qty: 30 | Refills: 0 | Status: DISCONTINUED | COMMUNITY
Start: 2017-11-13 | End: 2019-12-16

## 2019-12-17 ENCOUNTER — NON-APPOINTMENT (OUTPATIENT)
Age: 64
End: 2019-12-17

## 2019-12-17 LAB
ALBUMIN SERPL ELPH-MCNC: 4.6 G/DL
ALP BLD-CCNC: 56 U/L
ALT SERPL-CCNC: 18 U/L
ANION GAP SERPL CALC-SCNC: 10 MMOL/L
APPEARANCE: CLEAR
AST SERPL-CCNC: 23 U/L
BACTERIA: NEGATIVE
BASOPHILS # BLD AUTO: 0.03 K/UL
BASOPHILS NFR BLD AUTO: 0.6 %
BILIRUB SERPL-MCNC: 0.3 MG/DL
BILIRUBIN URINE: NEGATIVE
BLOOD URINE: NEGATIVE
BUN SERPL-MCNC: 17 MG/DL
CALCIUM SERPL-MCNC: 9.8 MG/DL
CHLORIDE SERPL-SCNC: 104 MMOL/L
CHOLEST SERPL-MCNC: 224 MG/DL
CHOLEST/HDLC SERPL: 3.8 RATIO
CO2 SERPL-SCNC: 27 MMOL/L
COLOR: NORMAL
CREAT SERPL-MCNC: 0.68 MG/DL
EOSINOPHIL # BLD AUTO: 0.08 K/UL
EOSINOPHIL NFR BLD AUTO: 1.6 %
ERYTHROCYTE [SEDIMENTATION RATE] IN BLOOD BY WESTERGREN METHOD: 4 MM/HR
ESTIMATED AVERAGE GLUCOSE: 123 MG/DL
GLUCOSE QUALITATIVE U: NEGATIVE
GLUCOSE SERPL-MCNC: 100 MG/DL
HBA1C MFR BLD HPLC: 5.9 %
HCT VFR BLD CALC: 41.9 %
HDLC SERPL-MCNC: 59 MG/DL
HGB BLD-MCNC: 13.1 G/DL
HYALINE CASTS: 0 /LPF
IMM GRANULOCYTES NFR BLD AUTO: 0.2 %
KETONES URINE: NEGATIVE
LDLC SERPL CALC-MCNC: 142 MG/DL
LDLC SERPL DIRECT ASSAY-MCNC: 153 MG/DL
LEUKOCYTE ESTERASE URINE: NEGATIVE
LYMPHOCYTES # BLD AUTO: 1.77 K/UL
LYMPHOCYTES NFR BLD AUTO: 35.9 %
MAN DIFF?: NORMAL
MCHC RBC-ENTMCNC: 28.1 PG
MCHC RBC-ENTMCNC: 31.3 GM/DL
MCV RBC AUTO: 89.7 FL
MICROSCOPIC-UA: NORMAL
MONOCYTES # BLD AUTO: 0.35 K/UL
MONOCYTES NFR BLD AUTO: 7.1 %
NEUTROPHILS # BLD AUTO: 2.69 K/UL
NEUTROPHILS NFR BLD AUTO: 54.6 %
NITRITE URINE: NEGATIVE
PH URINE: 5.5
PLATELET # BLD AUTO: 198 K/UL
POTASSIUM SERPL-SCNC: 5 MMOL/L
PROT SERPL-MCNC: 7.3 G/DL
PROTEIN URINE: NEGATIVE
RBC # BLD: 4.67 M/UL
RBC # FLD: 13.1 %
RED BLOOD CELLS URINE: 1 /HPF
SAVE SPECIMEN: NORMAL
SODIUM SERPL-SCNC: 141 MMOL/L
SPECIFIC GRAVITY URINE: 1.01
SQUAMOUS EPITHELIAL CELLS: 0 /HPF
T4 FREE SERPL-MCNC: 0.9 NG/DL
TRIGL SERPL-MCNC: 117 MG/DL
TSH SERPL-ACNC: 3.16 UIU/ML
UROBILINOGEN URINE: NORMAL
WBC # FLD AUTO: 4.93 K/UL
WHITE BLOOD CELLS URINE: 0 /HPF

## 2019-12-18 NOTE — ASSESSMENT
[FreeTextEntry1] : \par \par \par ecg---SR; short  NC; minimal NSSTT abn.\par cxr---pa/lat; NAPD\par pfts---restriction vs. technique\par \par imp---Meniere's disease--see above\par           impaired glucose tolerance---A1C=5.8 last year\par           hyperlipidemia----mildly elevated LDLs last year\par \par plan---FBW per CPE\par           continue meclizine prn\par           annual CPE\par           flu vaccine administered

## 2019-12-18 NOTE — HISTORY OF PRESENT ILLNESS
[FreeTextEntry1] : \par here for CPE [de-identified] : \par \par \par vertigo---episodic, no change; has exacerbation 1 month ago; minimal sx, lasted 1 day; meclizine improved sx.\par neuro w/u several yrs. ago; MRI-neg.; does maneuvers daily

## 2019-12-18 NOTE — PHYSICAL EXAM
[No Acute Distress] : no acute distress [Well Nourished] : well nourished [Well Developed] : well developed [Well-Appearing] : well-appearing [Normal Sclera/Conjunctiva] : normal sclera/conjunctiva [PERRL] : pupils equal round and reactive to light [EOMI] : extraocular movements intact [Normal Outer Ear/Nose] : the outer ears and nose were normal in appearance [Normal Oropharynx] : the oropharynx was normal [No JVD] : no jugular venous distention [No Lymphadenopathy] : no lymphadenopathy [Supple] : supple [Thyroid Normal, No Nodules] : the thyroid was normal and there were no nodules present [No Respiratory Distress] : no respiratory distress  [No Accessory Muscle Use] : no accessory muscle use [Clear to Auscultation] : lungs were clear to auscultation bilaterally [Normal Rate] : normal rate  [Regular Rhythm] : with a regular rhythm [Normal S1, S2] : normal S1 and S2 [No Murmur] : no murmur heard [No Carotid Bruits] : no carotid bruits [No Abdominal Bruit] : a ~M bruit was not heard ~T in the abdomen [No Varicosities] : no varicosities [Pedal Pulses Present] : the pedal pulses are present [No Edema] : there was no peripheral edema [No Palpable Aorta] : no palpable aorta [No Extremity Clubbing/Cyanosis] : no extremity clubbing/cyanosis [Soft] : abdomen soft [Non Tender] : non-tender [Non-distended] : non-distended [No Masses] : no abdominal mass palpated [No HSM] : no HSM [Normal Bowel Sounds] : normal bowel sounds [Normal Posterior Cervical Nodes] : no posterior cervical lymphadenopathy [Normal Anterior Cervical Nodes] : no anterior cervical lymphadenopathy [No CVA Tenderness] : no CVA  tenderness [No Spinal Tenderness] : no spinal tenderness [No Joint Swelling] : no joint swelling [Grossly Normal Strength/Tone] : grossly normal strength/tone [No Rash] : no rash [Coordination Grossly Intact] : coordination grossly intact [No Focal Deficits] : no focal deficits [Normal Gait] : normal gait [Deep Tendon Reflexes (DTR)] : deep tendon reflexes were 2+ and symmetric [Normal Affect] : the affect was normal [Normal Insight/Judgement] : insight and judgment were intact [de-identified] : per gyn.

## 2019-12-18 NOTE — HEALTH RISK ASSESSMENT
[Excellent] : ~his/her~  mood as  excellent [Intercurrent Urgi Care visits] : went to urgent care [Never (0 pts)] : Never (0 points) [No] : In the past 12 months have you used drugs other than those required for medical reasons? No [No falls in past year] : Patient reported no falls in the past year [0] : 2) Feeling down, depressed, or hopeless: Not at all (0) [Retired] : retired [None] : None [With Family] : lives with family [] :  [# Of Children ___] : has [unfilled] children [With Patient/Caregiver] : With Patient/Caregiver [I will adhere to the patient's wishes as expressed in the advance directive except as noted below.] : I will adhere to the patient's wishes as expressed in the advance directive except as noted below [] : No [de-identified] : gyn. in 2019 [de-identified] : exposed to 2nd hand smoke from spouse [de-identified] : walks regularly [de-identified] : likes meat [[id number]] : 0 [Reports changes in hearing] : Reports no changes in hearing [Reports changes in vision] : Reports no changes in vision [MammogramDate] : 11/18 [Reports changes in dental health] : Reports no changes in dental health [MammogramComments] : wants to wait until Nov. 2020 [ColonoscopyComments] : 5 yr. f/u [ColonoscopyDate] : 11/15 [BoneDensityDate] : 11/18 [AdvancecareDate] : 12/19

## 2020-08-03 ENCOUNTER — OUTPATIENT (OUTPATIENT)
Dept: OUTPATIENT SERVICES | Facility: HOSPITAL | Age: 65
LOS: 1 days | End: 2020-08-03
Payer: MEDICAID

## 2020-08-03 ENCOUNTER — LABORATORY RESULT (OUTPATIENT)
Age: 65
End: 2020-08-03

## 2020-08-03 ENCOUNTER — APPOINTMENT (OUTPATIENT)
Dept: OBGYN | Facility: CLINIC | Age: 65
End: 2020-08-03
Payer: MEDICAID

## 2020-08-03 VITALS — BODY MASS INDEX: 21.58 KG/M2 | WEIGHT: 118 LBS | DIASTOLIC BLOOD PRESSURE: 72 MMHG | SYSTOLIC BLOOD PRESSURE: 110 MMHG

## 2020-08-03 DIAGNOSIS — Z00.00 ENCOUNTER FOR GENERAL ADULT MEDICAL EXAMINATION W/OUT ABNORMAL FINDINGS: ICD-10-CM

## 2020-08-03 DIAGNOSIS — N76.0 ACUTE VAGINITIS: ICD-10-CM

## 2020-08-03 DIAGNOSIS — Z01.419 ENCOUNTER FOR GYNECOLOGICAL EXAMINATION (GENERAL) (ROUTINE) W/OUT ABNORMAL FINDINGS: ICD-10-CM

## 2020-08-03 PROCEDURE — 99396 PREV VISIT EST AGE 40-64: CPT | Mod: NC

## 2020-08-03 PROCEDURE — 87624 HPV HI-RISK TYP POOLED RSLT: CPT

## 2020-08-03 PROCEDURE — G0463: CPT

## 2020-08-03 NOTE — HISTORY OF PRESENT ILLNESS
[Menopause Age: ____] : age at menopause was [unfilled] [Post-Menopause, No Sxs] : post-menopausal, currently without symptoms [Sexually Active] : is sexually active [Male ___] : [unfilled] male

## 2020-08-04 LAB — HPV HIGH+LOW RISK DNA PNL CVX: SIGNIFICANT CHANGE UP

## 2020-08-05 NOTE — PHYSICAL EXAM
[Awake] : awake [Alert] : alert [Soft] : soft [Oriented x3] : oriented to person, place, and time [Vulvar Atrophy] : vulvar atrophy [No Bleeding] : there was no active vaginal bleeding [Normal] : uterus [Atrophy] : atrophy [Normal Position] : in a normal position [Uterine Adnexae] : were not tender and not enlarged [RRR, No Murmurs] : RRR, no murmurs [CTAB] : CTAB [Acute Distress] : no acute distress [LAD] : no lymphadenopathy [Thyroid Nodule] : no thyroid nodule [Goiter] : no goiter [Mass] : no breast mass [Nipple Discharge] : no nipple discharge [Axillary LAD] : no axillary lymphadenopathy [Tender] : non tender [Depressed Mood] : not depressed [Tenderness] : nontender [Enlarged ___ wks] : not enlarged [Mass ___ cm] : no uterine mass was palpated [Adnexa Tenderness] : were not tender [Ovarian Mass (___ Cm)] : there were no adnexal masses [FreeTextEntry4] : * use small speculum*

## 2020-08-06 LAB — CYTOLOGY SPEC DOC CYTO: SIGNIFICANT CHANGE UP

## 2020-08-17 DIAGNOSIS — Z01.419 ENCOUNTER FOR GYNECOLOGICAL EXAMINATION (GENERAL) (ROUTINE) WITHOUT ABNORMAL FINDINGS: ICD-10-CM

## 2020-12-14 ENCOUNTER — APPOINTMENT (OUTPATIENT)
Dept: INTERNAL MEDICINE | Facility: CLINIC | Age: 65
End: 2020-12-14
Payer: MEDICARE

## 2020-12-14 VITALS
HEART RATE: 66 BPM | SYSTOLIC BLOOD PRESSURE: 118 MMHG | OXYGEN SATURATION: 98 % | HEIGHT: 62 IN | DIASTOLIC BLOOD PRESSURE: 68 MMHG | BODY MASS INDEX: 22.45 KG/M2 | WEIGHT: 122 LBS | RESPIRATION RATE: 16 BRPM

## 2020-12-14 VITALS — DIASTOLIC BLOOD PRESSURE: 80 MMHG | SYSTOLIC BLOOD PRESSURE: 120 MMHG

## 2020-12-14 DIAGNOSIS — Z20.828 CONTACT WITH AND (SUSPECTED) EXPOSURE TO OTHER VIRAL COMMUNICABLE DISEASES: ICD-10-CM

## 2020-12-14 PROCEDURE — 93000 ELECTROCARDIOGRAM COMPLETE: CPT | Mod: 59

## 2020-12-14 PROCEDURE — 71046 X-RAY EXAM CHEST 2 VIEWS: CPT

## 2020-12-14 PROCEDURE — 99213 OFFICE O/P EST LOW 20 MIN: CPT | Mod: 25

## 2020-12-14 PROCEDURE — G0438: CPT

## 2020-12-15 LAB
24R-OH-CALCIDIOL SERPL-MCNC: 56.4 PG/ML
ALBUMIN SERPL ELPH-MCNC: 4.3 G/DL
ALP BLD-CCNC: 58 U/L
ALT SERPL-CCNC: 15 U/L
ANION GAP SERPL CALC-SCNC: 9 MMOL/L
AST SERPL-CCNC: 22 U/L
BASOPHILS # BLD AUTO: 0.01 K/UL
BASOPHILS NFR BLD AUTO: 0.2 %
BILIRUB SERPL-MCNC: 0.3 MG/DL
BUN SERPL-MCNC: 17 MG/DL
CALCIUM SERPL-MCNC: 9.2 MG/DL
CHLORIDE SERPL-SCNC: 104 MMOL/L
CHOLEST SERPL-MCNC: 200 MG/DL
CO2 SERPL-SCNC: 25 MMOL/L
CREAT SERPL-MCNC: 0.69 MG/DL
EOSINOPHIL # BLD AUTO: 0.08 K/UL
EOSINOPHIL NFR BLD AUTO: 1.7 %
ESTIMATED AVERAGE GLUCOSE: 120 MG/DL
GLUCOSE SERPL-MCNC: 106 MG/DL
HBA1C MFR BLD HPLC: 5.8 %
HCT VFR BLD CALC: 38 %
HDLC SERPL-MCNC: 47 MG/DL
HGB BLD-MCNC: 12.7 G/DL
IMM GRANULOCYTES NFR BLD AUTO: 0.2 %
LDLC SERPL CALC-MCNC: 126 MG/DL
LDLC SERPL DIRECT ASSAY-MCNC: 128 MG/DL
LYMPHOCYTES # BLD AUTO: 1.76 K/UL
LYMPHOCYTES NFR BLD AUTO: 38.2 %
MAN DIFF?: NORMAL
MCHC RBC-ENTMCNC: 30.4 PG
MCHC RBC-ENTMCNC: 33.4 GM/DL
MCV RBC AUTO: 90.9 FL
MONOCYTES # BLD AUTO: 0.37 K/UL
MONOCYTES NFR BLD AUTO: 8 %
NEUTROPHILS # BLD AUTO: 2.38 K/UL
NEUTROPHILS NFR BLD AUTO: 51.7 %
NONHDLC SERPL-MCNC: 153 MG/DL
PLATELET # BLD AUTO: 164 K/UL
POTASSIUM SERPL-SCNC: 4.3 MMOL/L
PROT SERPL-MCNC: 6.6 G/DL
RBC # BLD: 4.18 M/UL
RBC # FLD: 15 %
SARS-COV-2 IGG SERPL IA-ACNC: 0.07 INDEX
SARS-COV-2 IGG SERPL QL IA: NEGATIVE
SODIUM SERPL-SCNC: 138 MMOL/L
T3 SERPL-MCNC: 100 NG/DL
T4 FREE SERPL-MCNC: 1 NG/DL
TRIGL SERPL-MCNC: 137 MG/DL
TSH SERPL-ACNC: 2.29 UIU/ML
WBC # FLD AUTO: 4.61 K/UL

## 2020-12-23 ENCOUNTER — NON-APPOINTMENT (OUTPATIENT)
Age: 65
End: 2020-12-23

## 2020-12-23 PROBLEM — Z01.419 ENCOUNTER FOR ANNUAL ROUTINE GYNECOLOGICAL EXAMINATION: Status: RESOLVED | Noted: 2018-09-13 | Resolved: 2020-12-23

## 2021-01-09 DIAGNOSIS — Z01.818 ENCOUNTER FOR OTHER PREPROCEDURAL EXAMINATION: ICD-10-CM

## 2021-01-10 ENCOUNTER — APPOINTMENT (OUTPATIENT)
Dept: DISASTER EMERGENCY | Facility: CLINIC | Age: 66
End: 2021-01-10

## 2021-01-12 LAB — SARS-COV-2 N GENE NPH QL NAA+PROBE: NOT DETECTED

## 2021-01-13 ENCOUNTER — LABORATORY RESULT (OUTPATIENT)
Age: 66
End: 2021-01-13

## 2021-01-13 ENCOUNTER — APPOINTMENT (OUTPATIENT)
Dept: GASTROENTEROLOGY | Facility: CLINIC | Age: 66
End: 2021-01-13
Payer: MEDICAID

## 2021-01-13 PROCEDURE — 99072 ADDL SUPL MATRL&STAF TM PHE: CPT

## 2021-01-13 PROCEDURE — 45385 COLONOSCOPY W/LESION REMOVAL: CPT

## 2021-03-15 ENCOUNTER — APPOINTMENT (OUTPATIENT)
Dept: INTERNAL MEDICINE | Facility: CLINIC | Age: 66
End: 2021-03-15
Payer: MEDICARE

## 2021-03-15 VITALS
BODY MASS INDEX: 22.08 KG/M2 | TEMPERATURE: 97.8 F | HEART RATE: 78 BPM | HEIGHT: 62 IN | OXYGEN SATURATION: 98 % | WEIGHT: 120 LBS | SYSTOLIC BLOOD PRESSURE: 158 MMHG | DIASTOLIC BLOOD PRESSURE: 74 MMHG

## 2021-03-15 DIAGNOSIS — M54.2 CERVICALGIA: ICD-10-CM

## 2021-03-15 DIAGNOSIS — S09.90XA UNSPECIFIED INJURY OF HEAD, INITIAL ENCOUNTER: ICD-10-CM

## 2021-03-15 PROCEDURE — 99072 ADDL SUPL MATRL&STAF TM PHE: CPT

## 2021-03-15 PROCEDURE — 99214 OFFICE O/P EST MOD 30 MIN: CPT

## 2021-03-15 RX ORDER — CYCLOBENZAPRINE HYDROCHLORIDE 5 MG/1
5 TABLET, FILM COATED ORAL 3 TIMES DAILY
Qty: 30 | Refills: 0 | Status: ACTIVE | COMMUNITY
Start: 2021-03-15 | End: 1900-01-01

## 2021-11-07 ENCOUNTER — RX RENEWAL (OUTPATIENT)
Age: 66
End: 2021-11-07

## 2021-12-15 ENCOUNTER — APPOINTMENT (OUTPATIENT)
Dept: INTERNAL MEDICINE | Facility: CLINIC | Age: 66
End: 2021-12-15
Payer: MEDICAID

## 2021-12-15 VITALS
HEIGHT: 62 IN | SYSTOLIC BLOOD PRESSURE: 140 MMHG | WEIGHT: 122 LBS | BODY MASS INDEX: 22.45 KG/M2 | HEART RATE: 59 BPM | DIASTOLIC BLOOD PRESSURE: 84 MMHG | TEMPERATURE: 98.8 F | OXYGEN SATURATION: 98 %

## 2021-12-15 VITALS — DIASTOLIC BLOOD PRESSURE: 80 MMHG | SYSTOLIC BLOOD PRESSURE: 120 MMHG

## 2021-12-15 DIAGNOSIS — R73.02 IMPAIRED GLUCOSE TOLERANCE (ORAL): ICD-10-CM

## 2021-12-15 DIAGNOSIS — R03.0 ELEVATED BLOOD-PRESSURE READING, W/OUT DIAGNOSIS OF HYPERTENSION: ICD-10-CM

## 2021-12-15 LAB
ALBUMIN SERPL ELPH-MCNC: 4.4 G/DL
ALP BLD-CCNC: 60 U/L
ALT SERPL-CCNC: 18 U/L
ANION GAP SERPL CALC-SCNC: 8 MMOL/L
APPEARANCE: CLEAR
AST SERPL-CCNC: 25 U/L
BASOPHILS # BLD AUTO: 0.01 K/UL
BASOPHILS NFR BLD AUTO: 0.2 %
BILIRUB SERPL-MCNC: 0.4 MG/DL
BILIRUBIN URINE: NEGATIVE
BLOOD URINE: NEGATIVE
BUN SERPL-MCNC: 15 MG/DL
CALCIUM SERPL-MCNC: 9.3 MG/DL
CHLORIDE SERPL-SCNC: 105 MMOL/L
CHOLEST SERPL-MCNC: 208 MG/DL
CO2 SERPL-SCNC: 28 MMOL/L
COLOR: NORMAL
CREAT SERPL-MCNC: 0.69 MG/DL
EOSINOPHIL # BLD AUTO: 0.05 K/UL
EOSINOPHIL NFR BLD AUTO: 1.1 %
ERYTHROCYTE [SEDIMENTATION RATE] IN BLOOD BY WESTERGREN METHOD: 3 MM/HR
GLUCOSE QUALITATIVE U: NEGATIVE
GLUCOSE SERPL-MCNC: 99 MG/DL
HCT VFR BLD CALC: 39.3 %
HDLC SERPL-MCNC: 50 MG/DL
HGB BLD-MCNC: 12.6 G/DL
IMM GRANULOCYTES NFR BLD AUTO: 0.2 %
KETONES URINE: NEGATIVE
LDLC SERPL CALC-MCNC: 129 MG/DL
LDLC SERPL DIRECT ASSAY-MCNC: 127 MG/DL
LEUKOCYTE ESTERASE URINE: NEGATIVE
LYMPHOCYTES # BLD AUTO: 1.77 K/UL
LYMPHOCYTES NFR BLD AUTO: 37.3 %
MAN DIFF?: NORMAL
MCHC RBC-ENTMCNC: 27.9 PG
MCHC RBC-ENTMCNC: 32.1 GM/DL
MCV RBC AUTO: 87.1 FL
MONOCYTES # BLD AUTO: 0.28 K/UL
MONOCYTES NFR BLD AUTO: 5.9 %
NEUTROPHILS # BLD AUTO: 2.62 K/UL
NEUTROPHILS NFR BLD AUTO: 55.3 %
NITRITE URINE: NEGATIVE
NONHDLC SERPL-MCNC: 158 MG/DL
PH URINE: 7
PLATELET # BLD AUTO: 221 K/UL
POTASSIUM SERPL-SCNC: 4.6 MMOL/L
PROT SERPL-MCNC: 6.8 G/DL
PROTEIN URINE: NEGATIVE
RBC # BLD: 4.51 M/UL
RBC # FLD: 12.8 %
SODIUM SERPL-SCNC: 140 MMOL/L
SPECIFIC GRAVITY URINE: 1.01
T4 FREE SERPL-MCNC: 1 NG/DL
TRIGL SERPL-MCNC: 146 MG/DL
TSH SERPL-ACNC: 3.15 UIU/ML
UROBILINOGEN URINE: NORMAL
WBC # FLD AUTO: 4.74 K/UL

## 2021-12-15 PROCEDURE — 99397 PER PM REEVAL EST PAT 65+ YR: CPT | Mod: 25

## 2021-12-15 PROCEDURE — 71046 X-RAY EXAM CHEST 2 VIEWS: CPT

## 2021-12-15 PROCEDURE — 93000 ELECTROCARDIOGRAM COMPLETE: CPT

## 2021-12-15 NOTE — HISTORY OF PRESENT ILLNESS
[FreeTextEntry1] : \par \par here for CPE [de-identified] : \par \par generally well;\par \par Menieres----2 interval episodes; meclizine helpful\par

## 2021-12-15 NOTE — ASSESSMENT
[FreeTextEntry1] : \par ecg---done today in office for dizziness; SB; otherwise WNL\par cxr---done today in office for elevated BP; pa/lat.; scoliosis; tortuous aorta; NAPD\par \par imp--Meniere's disease---interval episodes as noted above; meclizine helpful\par         impaired glucose tolerance---normal fbg; A1C pending\par         hyperlipidemia---mildly elevated LDL and non-HDL cholesterol; not on statin\par         elevated BP w/o dx. of hypertension--transient elevation of systolic BP\par \par plan---FBW per CPE (results in Allscript in purvi. of CPE); will inform pt.; A1C is pending\par            continue prn meclizine for Meniere's\par            DEXA---to be ordered by gyn.\par            screening mammogram---to be ordered by gyn.\par            annual CPE\par \par    \par         \par \par

## 2021-12-15 NOTE — HEALTH RISK ASSESSMENT
[Good] : ~his/her~  mood as  good [Never] : Never [4 or more  times a week (4 pts)] : 4 or more  times a week (4 points) [1 or 2 (0 pts)] : 1 or 2 (0 points) [No] : In the past 12 months have you used drugs other than those required for medical reasons? No [No falls in past year] : Patient reported no falls in the past year [0] : 2) Feeling down, depressed, or hopeless: Not at all (0) [Patient reported colonoscopy was abnormal] : Patient reported colonoscopy was abnormal [HIV Test offered] : HIV Test offered [Hepatitis C test offered] : Hepatitis C test offered [None] : None [With Significant Other] : lives with significant other [Retired] : retired [High School] : high school [] :  [# Of Children ___] : has [unfilled] children [Feels Safe at Home] : Feels safe at home [Fully functional (bathing, dressing, toileting, transferring, walking, feeding)] : Fully functional (bathing, dressing, toileting, transferring, walking, feeding) [Fully functional (using the telephone, shopping, preparing meals, housekeeping, doing laundry, using] : Fully functional and needs no help or supervision to perform IADLs (using the telephone, shopping, preparing meals, housekeeping, doing laundry, using transportation, managing medications and managing finances) [Reports normal functional visual acuity (ie: able to read med bottle)] : Reports normal functional visual acuity [Smoke Detector] : smoke detector [Carbon Monoxide Detector] : carbon monoxide detector [Seat Belt] :  uses seat belt [Sunscreen] : uses sunscreen [With Patient/Caregiver] : , with patient/caregiver [Designated Healthcare Proxy] : Designated healthcare proxy [Name: ___] : Health Care Proxy's Name: [unfilled]  [Relationship: ___] : Relationship: [unfilled] [I will adhere to the patient's wishes.] : I will adhere to the patient's wishes. [de-identified] : opth; gyn----yearly exam [de-identified] : wine with dinner [de-identified] : walking....40 minutes; daily [de-identified] : ad alessandra [[id number]] : 0 [Change in mental status noted] : No change in mental status noted [High Risk Behavior] : no high risk behavior [Reports changes in hearing] : Reports no changes in hearing [Reports changes in vision] : Reports no changes in vision [Reports changes in dental health] : Reports no changes in dental health [Guns at Home] : no guns at home [Travel to Developing Areas] : does not  travel to developing areas [TB Exposure] : is not being exposed to tuberculosis [MammogramDate] : 12/20 [PapSmearDate] : 12/20 [BoneDensityDate] : 11/18 [ColonoscopyDate] : 01/21 [ColonoscopyComments] : polypx.; f/u 3 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

## 2021-12-16 ENCOUNTER — NON-APPOINTMENT (OUTPATIENT)
Age: 66
End: 2021-12-16

## 2021-12-17 LAB
ESTIMATED AVERAGE GLUCOSE: 126 MG/DL
HBA1C MFR BLD HPLC: 6 %

## 2021-12-30 ENCOUNTER — APPOINTMENT (OUTPATIENT)
Dept: OTOLARYNGOLOGY | Facility: CLINIC | Age: 66
End: 2021-12-30
Payer: MEDICAID

## 2021-12-30 ENCOUNTER — NON-APPOINTMENT (OUTPATIENT)
Age: 66
End: 2021-12-30

## 2021-12-30 VITALS
DIASTOLIC BLOOD PRESSURE: 76 MMHG | HEART RATE: 62 BPM | BODY MASS INDEX: 23.03 KG/M2 | WEIGHT: 122 LBS | HEIGHT: 61 IN | SYSTOLIC BLOOD PRESSURE: 145 MMHG | TEMPERATURE: 97.9 F

## 2021-12-30 DIAGNOSIS — H90.12 CONDUCTIVE HEARING LOSS, UNILATERAL, LEFT EAR, WITH UNRESTRICTED HEARING ON THE CONTRALATERAL SIDE: ICD-10-CM

## 2021-12-30 DIAGNOSIS — H61.22 IMPACTED CERUMEN, LEFT EAR: ICD-10-CM

## 2021-12-30 PROCEDURE — 99203 OFFICE O/P NEW LOW 30 MIN: CPT | Mod: 25

## 2021-12-30 PROCEDURE — 69210 REMOVE IMPACTED EAR WAX UNI: CPT

## 2021-12-30 NOTE — ASSESSMENT
[FreeTextEntry1] : 65 y/o F with b/l Hl and cerumen, on exam L CI which was cleared, normal TM \par Hearing loss - likely conductive resolved after disimpaction \par patient declined audiogram \par ear hygiene\par discussed preventive measures and signs of accumulation \par avoid q tips\par \par f/up prn

## 2021-12-30 NOTE — HISTORY OF PRESENT ILLNESS
[de-identified] : 65 y/o F c/o b/l cerumen impaction and b/l HL x 6 months. Pt went to PMD who stated she has some cerumen in her ears. \par Last time ear cleaned was 2017\par some hearing changes, feels very "clogged". \par uses q tips, no drops used. \par no Vertigo, tinnitus, pain, drainage or facial weakness.\par

## 2021-12-30 NOTE — END OF VISIT
[FreeTextEntry3] : I personally saw and examined RUBIA GOMEZ in detail.  I spoke to JOSE Ureña regarding the assessment and plan of care. I performed the procedures and relevant physical exam.  I have reviewed the above assessment and plan of care and I agree.  I have made changes to the body of the note wherever necessary and appropriate.

## 2022-01-10 ENCOUNTER — APPOINTMENT (OUTPATIENT)
Dept: OBGYN | Facility: CLINIC | Age: 67
End: 2022-01-10
Payer: MEDICARE

## 2022-01-10 ENCOUNTER — OUTPATIENT (OUTPATIENT)
Dept: OUTPATIENT SERVICES | Facility: HOSPITAL | Age: 67
LOS: 1 days | End: 2022-01-10
Payer: MEDICARE

## 2022-01-10 ENCOUNTER — RESULT REVIEW (OUTPATIENT)
Age: 67
End: 2022-01-10

## 2022-01-10 VITALS — SYSTOLIC BLOOD PRESSURE: 120 MMHG | DIASTOLIC BLOOD PRESSURE: 74 MMHG | BODY MASS INDEX: 23.43 KG/M2 | WEIGHT: 124 LBS

## 2022-01-10 DIAGNOSIS — Z01.419 ENCOUNTER FOR GYNECOLOGICAL EXAMINATION (GENERAL) (ROUTINE) W/OUT ABNORMAL FINDINGS: ICD-10-CM

## 2022-01-10 DIAGNOSIS — N76.0 ACUTE VAGINITIS: ICD-10-CM

## 2022-01-10 PROCEDURE — 99213 OFFICE O/P EST LOW 20 MIN: CPT

## 2022-01-10 PROCEDURE — G0463: CPT

## 2022-01-12 NOTE — REVIEW OF SYSTEMS
[Negative] : Heme/Lymph [Recent Weight Gain (___ Lbs)] : no recent weight gain [Recent Weight Loss (___ Lbs)] : no recent weight loss [Incontinence] : no incontinence [Dysuria] : no dysuria [Abn Vaginal bleeding] : no abnormal vaginal bleeding [Pelvic pain] : no pelvic pain [Breast Pain] : no breast pain [Breast Lump] : no breast lump

## 2022-01-12 NOTE — PHYSICAL EXAM
[Appropriately responsive] : appropriately responsive [Alert] : alert [No Acute Distress] : no acute distress [Regular Rate Rhythm] : regular rate rhythm [No Murmurs] : no murmurs [Soft] : soft [Non-tender] : non-tender [Examination Of The Breasts] : a normal appearance [No Discharge] : no discharge [No Masses] : no breast masses were palpable [Labia Majora] : normal [Labia Minora] : normal [Normal] : normal [Chaperone Present] : A chaperone was present in the examining room during all aspects of the physical examination [No Lymphadenopathy] : no lymphadenopathy [Clear to Auscultation B/L] : clear to auscultation bilaterally [Non-distended] : non-distended [No HSM] : No HSM [No Lesions] : no lesions [No Mass] : no mass [Oriented x3] : oriented x3 [Vulvar Atrophy] : vulvar atrophy [Atrophy] : atrophy [No Bleeding] : There was no active vaginal bleeding [Uterine Adnexae] : non-palpable [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

## 2022-01-12 NOTE — DISCUSSION/SUMMARY
[FreeTextEntry1] : 65yo G0-  post menopausal exam\par - no paps needed-  reviewed guidelines with pt/  no history of dysplasia.  3 neg pap/hpv within last 10yrs.\par - [ ]mammo referral\par - colonoscopy up to date (rpt 2024)\par - [ ]dexa referral\par - gen [facility]  followed by medicine\par \par RTC for maria g/prn or for any PMB. \par Davide Murguia, PAC\par

## 2022-01-12 NOTE — HISTORY OF PRESENT ILLNESS
[postmenopausal] : postmenopausal [Y] : Patient is sexually active [N] : Patient denies prior pregnancies [Currently Active] : currently active [Men] : men [No] : No [FreeTextEntry1] : 67YO G0 ([id number]) presents for annual GYN visit.  & is doing well with no acute complaints. Denies changes in urinary or bowel habits, abdominal pain, vaginal dryness, vaginal discharge. Denies PMB.  Pt counseled on cessation of routine PAP testing due to negative prior PAP/HPV testing. Pt was given mammography & DEXA scan referrals today. \par \par \par - PAP 2020 neg/ hpv  ( no h/p of dysplasia)\par - mammo 12/2020 [facility]\par - colonoscopy- 1/2021 (rpt 3yrs)\par - dexa 2018 wnl\par \par Gen health followed by Medicine\par - s/p COVID vaccine and booster

## 2022-01-24 DIAGNOSIS — Z01.419 ENCOUNTER FOR GYNECOLOGICAL EXAMINATION (GENERAL) (ROUTINE) WITHOUT ABNORMAL FINDINGS: ICD-10-CM

## 2022-03-01 ENCOUNTER — APPOINTMENT (OUTPATIENT)
Dept: RADIOLOGY | Facility: CLINIC | Age: 67
End: 2022-03-01
Payer: MEDICARE

## 2022-03-01 ENCOUNTER — APPOINTMENT (OUTPATIENT)
Dept: MAMMOGRAPHY | Facility: CLINIC | Age: 67
End: 2022-03-01
Payer: MEDICARE

## 2022-03-01 ENCOUNTER — RESULT REVIEW (OUTPATIENT)
Age: 67
End: 2022-03-01

## 2022-03-01 PROCEDURE — 77067 SCR MAMMO BI INCL CAD: CPT

## 2022-03-01 PROCEDURE — 77085 DXA BONE DENSITY AXL VRT FX: CPT

## 2022-03-01 PROCEDURE — 77063 BREAST TOMOSYNTHESIS BI: CPT

## 2022-12-15 ENCOUNTER — APPOINTMENT (OUTPATIENT)
Dept: INTERNAL MEDICINE | Facility: CLINIC | Age: 67
End: 2022-12-15

## 2023-01-02 ENCOUNTER — RESULT CHARGE (OUTPATIENT)
Age: 68
End: 2023-01-02

## 2023-01-26 ENCOUNTER — RESULT CHARGE (OUTPATIENT)
Age: 68
End: 2023-01-26

## 2023-01-27 ENCOUNTER — APPOINTMENT (OUTPATIENT)
Dept: INTERNAL MEDICINE | Facility: CLINIC | Age: 68
End: 2023-01-27
Payer: MEDICARE

## 2023-01-27 VITALS
WEIGHT: 114 LBS | SYSTOLIC BLOOD PRESSURE: 128 MMHG | HEART RATE: 63 BPM | BODY MASS INDEX: 21.52 KG/M2 | DIASTOLIC BLOOD PRESSURE: 80 MMHG | HEIGHT: 61 IN | OXYGEN SATURATION: 96 %

## 2023-01-27 DIAGNOSIS — E55.9 VITAMIN D DEFICIENCY, UNSPECIFIED: ICD-10-CM

## 2023-01-27 DIAGNOSIS — B00.9 HERPESVIRAL INFECTION, UNSPECIFIED: ICD-10-CM

## 2023-01-27 LAB
25(OH)D3 SERPL-MCNC: 24 NG/ML
ALBUMIN SERPL ELPH-MCNC: 4.7 G/DL
ALP BLD-CCNC: 65 U/L
ALT SERPL-CCNC: 21 U/L
ANION GAP SERPL CALC-SCNC: 11 MMOL/L
APO LP(A) SERPL-MCNC: 78.6 NMOL/L
AST SERPL-CCNC: 24 U/L
BASOPHILS # BLD AUTO: 0.02 K/UL
BASOPHILS NFR BLD AUTO: 0.4 %
BILIRUB SERPL-MCNC: 0.5 MG/DL
BUN SERPL-MCNC: 20 MG/DL
CALCIUM SERPL-MCNC: 9.9 MG/DL
CHLORIDE SERPL-SCNC: 102 MMOL/L
CHOLEST SERPL-MCNC: 233 MG/DL
CO2 SERPL-SCNC: 27 MMOL/L
CREAT SERPL-MCNC: 0.74 MG/DL
EGFR: 89 ML/MIN/1.73M2
EOSINOPHIL # BLD AUTO: 0.05 K/UL
EOSINOPHIL NFR BLD AUTO: 1 %
ESTIMATED AVERAGE GLUCOSE: 126 MG/DL
GLUCOSE SERPL-MCNC: 107 MG/DL
HBA1C MFR BLD HPLC: 6 %
HCT VFR BLD CALC: 39.4 %
HDLC SERPL-MCNC: 68 MG/DL
HGB BLD-MCNC: 12.8 G/DL
IMM GRANULOCYTES NFR BLD AUTO: 0.2 %
LDLC SERPL CALC-MCNC: 150 MG/DL
LYMPHOCYTES # BLD AUTO: 2.22 K/UL
LYMPHOCYTES NFR BLD AUTO: 44.9 %
MAN DIFF?: NORMAL
MCHC RBC-ENTMCNC: 28.2 PG
MCHC RBC-ENTMCNC: 32.5 GM/DL
MCV RBC AUTO: 86.8 FL
MONOCYTES # BLD AUTO: 0.39 K/UL
MONOCYTES NFR BLD AUTO: 7.9 %
NEUTROPHILS # BLD AUTO: 2.25 K/UL
NEUTROPHILS NFR BLD AUTO: 45.6 %
NONHDLC SERPL-MCNC: 165 MG/DL
PLATELET # BLD AUTO: 190 K/UL
POTASSIUM SERPL-SCNC: 4.8 MMOL/L
PROT SERPL-MCNC: 6.8 G/DL
RBC # BLD: 4.54 M/UL
RBC # FLD: 13.3 %
SODIUM SERPL-SCNC: 139 MMOL/L
TRIGL SERPL-MCNC: 76 MG/DL
TSH SERPL-ACNC: 2.74 UIU/ML
WBC # FLD AUTO: 4.94 K/UL

## 2023-01-27 PROCEDURE — 93000 ELECTROCARDIOGRAM COMPLETE: CPT | Mod: 59

## 2023-01-27 PROCEDURE — 99214 OFFICE O/P EST MOD 30 MIN: CPT | Mod: 25

## 2023-01-27 PROCEDURE — G0444 DEPRESSION SCREEN ANNUAL: CPT | Mod: 59

## 2023-01-27 PROCEDURE — G0439: CPT

## 2023-01-27 NOTE — HEALTH RISK ASSESSMENT
[Never] : Never [Yes] : Yes [Monthly or less (1 pt)] : Monthly or less (1 point) [1 or 2 (0 pts)] : 1 or 2 (0 points) [No] : In the past 12 months have you used drugs other than those required for medical reasons? No [No falls in past year] : Patient reported no falls in the past year [0] : 2) Feeling down, depressed, or hopeless: Not at all (0) [PHQ-2 Negative - No further assessment needed] : PHQ-2 Negative - No further assessment needed [de-identified] : walks  [de-identified] : balanced varied diet without restrictions [[id number]] : 0 [Very Good] : ~his/her~  mood as very good [Patient reported mammogram was normal] : Patient reported mammogram was normal [Patient reported bone density results were abnormal] : Patient reported bone density results were abnormal [With Family] : lives with family [# of Members in Household ___] :  household currently consist of [unfilled] member(s) [Retired] : retired [] :  [Sexually Active] : sexually active [High Risk Behavior] : no high risk behavior [Feels Safe at Home] : Feels safe at home [Fully functional (bathing, dressing, toileting, transferring, walking, feeding)] : Fully functional (bathing, dressing, toileting, transferring, walking, feeding) [Fully functional (using the telephone, shopping, preparing meals, housekeeping, doing laundry, using] : Fully functional and needs no help or supervision to perform IADLs (using the telephone, shopping, preparing meals, housekeeping, doing laundry, using transportation, managing medications and managing finances) [Reports changes in hearing] : Reports no changes in hearing [Reports changes in vision] : Reports no changes in vision [Reports normal functional visual acuity (ie: able to read med bottle)] : Reports poor functional visual acuity.  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

## 2023-01-27 NOTE — HISTORY OF PRESENT ILLNESS
[de-identified] : Patient presents for comprehensive medical evaluation today.\par Patient has been in good overall health this past year and has no active complaints.\par All data, labs, consult notes and studies reviewed. \par \par \par  [FreeTextEntry1] : Patient presents for comprehensive medical evaluation today.\par

## 2023-01-27 NOTE — PLAN
[FreeTextEntry1] : HCM\par \par *Immunizations \par COVID -19 UTD \par Influenza 2022 \par Pneumococcal - denies\par TDAP - declines \par Shingrix - Patient is interested in the Shingrix vaccination. A prescription for Shingrix was given to the patient to be administered by the pharmacist.\par Patient will call us and inform us of date of vaccination.\par \par  \par Preventive medicine discussed - including importance of lifestyle modification - with incorporation of healthy diet + regular exercise\par \par \par #HLD \par lifestyle modification discussed at length\par will initiate statin \par repeat lipid profile 3 months\par \par \par

## 2023-01-27 NOTE — HEALTH RISK ASSESSMENT
[Never] : Never [Yes] : Yes [Monthly or less (1 pt)] : Monthly or less (1 point) [1 or 2 (0 pts)] : 1 or 2 (0 points) [No] : In the past 12 months have you used drugs other than those required for medical reasons? No [No falls in past year] : Patient reported no falls in the past year [0] : 2) Feeling down, depressed, or hopeless: Not at all (0) [PHQ-2 Negative - No further assessment needed] : PHQ-2 Negative - No further assessment needed [de-identified] : walks  [de-identified] : balanced varied diet without restrictions [[id number]] : 0 [Very Good] : ~his/her~  mood as very good [Patient reported mammogram was normal] : Patient reported mammogram was normal [Patient reported bone density results were abnormal] : Patient reported bone density results were abnormal [With Family] : lives with family [# of Members in Household ___] :  household currently consist of [unfilled] member(s) [Retired] : retired [] :  [Sexually Active] : sexually active [High Risk Behavior] : no high risk behavior [Feels Safe at Home] : Feels safe at home [Fully functional (bathing, dressing, toileting, transferring, walking, feeding)] : Fully functional (bathing, dressing, toileting, transferring, walking, feeding) [Fully functional (using the telephone, shopping, preparing meals, housekeeping, doing laundry, using] : Fully functional and needs no help or supervision to perform IADLs (using the telephone, shopping, preparing meals, housekeeping, doing laundry, using transportation, managing medications and managing finances) [Reports changes in hearing] : Reports no changes in hearing [Reports changes in vision] : Reports no changes in vision [Reports normal functional visual acuity (ie: able to read med bottle)] : Reports poor functional visual acuity.  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

## 2023-01-27 NOTE — HISTORY OF PRESENT ILLNESS
[de-identified] : Patient presents for comprehensive medical evaluation today.\par Patient has been in good overall health this past year and has no active complaints.\par All data, labs, consult notes and studies reviewed. \par \par \par  [FreeTextEntry1] : Patient presents for comprehensive medical evaluation today.\par

## 2023-03-28 ENCOUNTER — APPOINTMENT (OUTPATIENT)
Dept: MAMMOGRAPHY | Facility: CLINIC | Age: 68
End: 2023-03-28
Payer: MEDICARE

## 2023-03-28 ENCOUNTER — RESULT REVIEW (OUTPATIENT)
Age: 68
End: 2023-03-28

## 2023-03-28 PROCEDURE — 77067 SCR MAMMO BI INCL CAD: CPT

## 2023-03-28 PROCEDURE — 77063 BREAST TOMOSYNTHESIS BI: CPT

## 2023-07-12 ENCOUNTER — RX RENEWAL (OUTPATIENT)
Age: 68
End: 2023-07-12

## 2023-08-13 RX ORDER — MECLIZINE HYDROCHLORIDE 12.5 MG/1
12.5 TABLET ORAL 3 TIMES DAILY
Qty: 90 | Refills: 3 | Status: ACTIVE | COMMUNITY
Start: 2017-11-13 | End: 1900-01-01

## 2024-02-05 ENCOUNTER — APPOINTMENT (OUTPATIENT)
Dept: INTERNAL MEDICINE | Facility: CLINIC | Age: 69
End: 2024-02-05
Payer: MEDICARE

## 2024-02-05 ENCOUNTER — NON-APPOINTMENT (OUTPATIENT)
Age: 69
End: 2024-02-05

## 2024-02-05 VITALS
HEIGHT: 61 IN | WEIGHT: 111 LBS | SYSTOLIC BLOOD PRESSURE: 134 MMHG | HEART RATE: 55 BPM | TEMPERATURE: 98 F | BODY MASS INDEX: 20.96 KG/M2 | DIASTOLIC BLOOD PRESSURE: 80 MMHG | OXYGEN SATURATION: 97 %

## 2024-02-05 DIAGNOSIS — H81.09 MENIERE'S DISEASE, UNSPECIFIED EAR: ICD-10-CM

## 2024-02-05 DIAGNOSIS — L82.1 OTHER SEBORRHEIC KERATOSIS: ICD-10-CM

## 2024-02-05 DIAGNOSIS — D18.01 HEMANGIOMA OF SKIN AND SUBCUTANEOUS TISSUE: ICD-10-CM

## 2024-02-05 DIAGNOSIS — G25.0 ESSENTIAL TREMOR: ICD-10-CM

## 2024-02-05 DIAGNOSIS — E78.5 HYPERLIPIDEMIA, UNSPECIFIED: ICD-10-CM

## 2024-02-05 DIAGNOSIS — R73.03 PREDIABETES.: ICD-10-CM

## 2024-02-05 DIAGNOSIS — M85.80 OTHER SPECIFIED DISORDERS OF BONE DENSITY AND STRUCTURE, UNSPECIFIED SITE: ICD-10-CM

## 2024-02-05 DIAGNOSIS — F41.9 ANXIETY DISORDER, UNSPECIFIED: ICD-10-CM

## 2024-02-05 DIAGNOSIS — R00.1 BRADYCARDIA, UNSPECIFIED: ICD-10-CM

## 2024-02-05 PROCEDURE — 36415 COLL VENOUS BLD VENIPUNCTURE: CPT

## 2024-02-05 PROCEDURE — G0439: CPT

## 2024-02-05 PROCEDURE — G2211 COMPLEX E/M VISIT ADD ON: CPT | Mod: NC,1L

## 2024-02-05 PROCEDURE — G0444 DEPRESSION SCREEN ANNUAL: CPT | Mod: 59

## 2024-02-05 PROCEDURE — 99215 OFFICE O/P EST HI 40 MIN: CPT | Mod: 25

## 2024-02-05 PROCEDURE — 93000 ELECTROCARDIOGRAM COMPLETE: CPT | Mod: 59

## 2024-02-05 RX ORDER — PROPRANOLOL HYDROCHLORIDE 10 MG/1
10 TABLET ORAL
Qty: 30 | Refills: 0 | Status: ACTIVE | COMMUNITY
Start: 2024-02-05 | End: 1900-01-01

## 2024-02-05 NOTE — HISTORY OF PRESENT ILLNESS
[FreeTextEntry1] : Patient presents for comprehensive medical evaluation today.\par   [de-identified] : Patient presents for comprehensive medical evaluation today. Patient has been in good overall health this past year and has no active complaints. All data, labs, consult notes and studies reviewed.   2/2024 visit: Had vertigo attack last weekend - was dehydrated and under stress Otherwise reports essential tremor has been causing her some difficulty with eating - willing to attempt medication  Otherwise feels well

## 2024-02-05 NOTE — HEALTH RISK ASSESSMENT
[Very Good] : ~his/her~  mood as very good [Yes] : Yes [Monthly or less (1 pt)] : Monthly or less (1 point) [1 or 2 (0 pts)] : 1 or 2 (0 points) [No] : In the past 12 months have you used drugs other than those required for medical reasons? No [No falls in past year] : Patient reported no falls in the past year [0] : 2) Feeling down, depressed, or hopeless: Not at all (0) [PHQ-2 Negative - No further assessment needed] : PHQ-2 Negative - No further assessment needed [Patient reported mammogram was normal] : Patient reported mammogram was normal [Patient reported bone density results were abnormal] : Patient reported bone density results were abnormal [With Family] : lives with family [# of Members in Household ___] :  household currently consist of [unfilled] member(s) [Retired] : retired [] :  [Sexually Active] : sexually active [Feels Safe at Home] : Feels safe at home [Fully functional (bathing, dressing, toileting, transferring, walking, feeding)] : Fully functional (bathing, dressing, toileting, transferring, walking, feeding) [Fully functional (using the telephone, shopping, preparing meals, housekeeping, doing laundry, using] : Fully functional and needs no help or supervision to perform IADLs (using the telephone, shopping, preparing meals, housekeeping, doing laundry, using transportation, managing medications and managing finances) [Never] : Never [de-identified] : walks  [de-identified] : balanced varied diet without restrictions [[id number]] : 0 [High Risk Behavior] : no high risk behavior [Reports changes in hearing] : Reports no changes in hearing [Reports changes in vision] : Reports no changes in vision [Reports normal functional visual acuity (ie: able to read med bottle)] : Reports poor functional visual acuity.  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

## 2024-02-05 NOTE — PLAN
[FreeTextEntry1] : HCM  *Immunizations  COVID -19 UTD  Influenza annually Pneumococcal - declines TDAP - declines  Shingrix - declines   Preventive medicine discussed - including importance of lifestyle modification - with incorporation of healthy diet + regular exercise   #HLD  lifestyle modification discussed at length will initiate statin  repeat lipid profile 3 months  #Essential tremor attempt trial of propranolol - low dose   #Vertigo  meclizine prn  infrequent episodes

## 2024-02-08 LAB
25(OH)D3 SERPL-MCNC: 35 NG/ML
ALBUMIN SERPL ELPH-MCNC: 4.7 G/DL
ALP BLD-CCNC: 60 U/L
ALT SERPL-CCNC: 24 U/L
ANION GAP SERPL CALC-SCNC: 11 MMOL/L
AST SERPL-CCNC: 25 U/L
BASOPHILS # BLD AUTO: 0.04 K/UL
BASOPHILS NFR BLD AUTO: 0.7 %
BILIRUB SERPL-MCNC: 0.4 MG/DL
BUN SERPL-MCNC: 15 MG/DL
CALCIUM SERPL-MCNC: 9.5 MG/DL
CHLORIDE SERPL-SCNC: 103 MMOL/L
CHOLEST SERPL-MCNC: 165 MG/DL
CO2 SERPL-SCNC: 26 MMOL/L
CREAT SERPL-MCNC: 0.81 MG/DL
EGFR: 79 ML/MIN/1.73M2
EOSINOPHIL # BLD AUTO: 0.06 K/UL
EOSINOPHIL NFR BLD AUTO: 1.1 %
ESTIMATED AVERAGE GLUCOSE: 126 MG/DL
FERRITIN SERPL-MCNC: 177 NG/ML
FOLATE SERPL-MCNC: >20 NG/ML
GLUCOSE SERPL-MCNC: 102 MG/DL
HBA1C MFR BLD HPLC: 6 %
HCT VFR BLD CALC: 41.6 %
HDLC SERPL-MCNC: 66 MG/DL
HGB BLD-MCNC: 13.6 G/DL
IMM GRANULOCYTES NFR BLD AUTO: 0.2 %
IRON SATN MFR SERPL: 32 %
IRON SERPL-MCNC: 112 UG/DL
LDLC SERPL CALC-MCNC: 83 MG/DL
LYMPHOCYTES # BLD AUTO: 1.76 K/UL
LYMPHOCYTES NFR BLD AUTO: 32.4 %
MAN DIFF?: NORMAL
MCHC RBC-ENTMCNC: 28 PG
MCHC RBC-ENTMCNC: 32.7 GM/DL
MCV RBC AUTO: 85.6 FL
MONOCYTES # BLD AUTO: 0.37 K/UL
MONOCYTES NFR BLD AUTO: 6.8 %
NEUTROPHILS # BLD AUTO: 3.19 K/UL
NEUTROPHILS NFR BLD AUTO: 58.8 %
NONHDLC SERPL-MCNC: 99 MG/DL
PLATELET # BLD AUTO: 206 K/UL
POTASSIUM SERPL-SCNC: 4.8 MMOL/L
PROT SERPL-MCNC: 7 G/DL
RBC # BLD: 4.86 M/UL
RBC # FLD: 13.3 %
SODIUM SERPL-SCNC: 140 MMOL/L
TIBC SERPL-MCNC: 350 UG/DL
TRIGL SERPL-MCNC: 91 MG/DL
TSH SERPL-ACNC: 2.62 UIU/ML
UIBC SERPL-MCNC: 238 UG/DL
VIT B12 SERPL-MCNC: 710 PG/ML
WBC # FLD AUTO: 5.43 K/UL

## 2024-02-21 NOTE — PATIENT PROFILE ADULT. - FUNCTIONAL SCREEN CURRENT LEVEL: EATING, MLM
Physical Therapy    Patient not seen in therapy.     Unavailable due to eating meal.      Re-attempt plan: later today or tomorrow    Patient was unable to participate in skilled therapy at this time secondary to eating lunch.  Skilled therapy will attempt to see patient later in the day as schedules permits.                             Documented in the chart in the following areas: Assessment/Plan.        Therapy procedure time and total treatment time can be found documented on the Time Entry flowsheet   (0) independent

## 2024-04-03 RX ORDER — ATORVASTATIN CALCIUM 10 MG/1
10 TABLET, FILM COATED ORAL
Qty: 90 | Refills: 3 | Status: ACTIVE | COMMUNITY
Start: 2023-01-27 | End: 1900-01-01

## 2024-05-06 ENCOUNTER — RESULT REVIEW (OUTPATIENT)
Age: 69
End: 2024-05-06

## 2024-05-06 ENCOUNTER — APPOINTMENT (OUTPATIENT)
Dept: RADIOLOGY | Facility: CLINIC | Age: 69
End: 2024-05-06
Payer: MEDICARE

## 2024-05-06 ENCOUNTER — APPOINTMENT (OUTPATIENT)
Dept: MAMMOGRAPHY | Facility: CLINIC | Age: 69
End: 2024-05-06
Payer: MEDICARE

## 2024-05-06 PROCEDURE — 71046 X-RAY EXAM CHEST 2 VIEWS: CPT

## 2024-05-06 PROCEDURE — 77080 DXA BONE DENSITY AXIAL: CPT

## 2024-05-06 PROCEDURE — 77067 SCR MAMMO BI INCL CAD: CPT

## 2024-05-06 PROCEDURE — 77063 BREAST TOMOSYNTHESIS BI: CPT

## 2024-07-27 ENCOUNTER — NON-APPOINTMENT (OUTPATIENT)
Age: 69
End: 2024-07-27

## 2024-10-15 ENCOUNTER — TRANSCRIPTION ENCOUNTER (OUTPATIENT)
Age: 69
End: 2024-10-15

## 2024-10-15 ENCOUNTER — APPOINTMENT (OUTPATIENT)
Dept: GASTROENTEROLOGY | Facility: CLINIC | Age: 69
End: 2024-10-15
Payer: MEDICARE

## 2024-10-15 ENCOUNTER — LABORATORY RESULT (OUTPATIENT)
Age: 69
End: 2024-10-15

## 2024-10-15 PROCEDURE — 45380 COLONOSCOPY AND BIOPSY: CPT

## 2024-11-27 ENCOUNTER — RX RENEWAL (OUTPATIENT)
Age: 69
End: 2024-11-27

## 2025-02-04 ENCOUNTER — APPOINTMENT (OUTPATIENT)
Dept: INTERNAL MEDICINE | Facility: CLINIC | Age: 70
End: 2025-02-04
Payer: MEDICARE

## 2025-02-04 PROCEDURE — 36415 COLL VENOUS BLD VENIPUNCTURE: CPT

## 2025-02-11 ENCOUNTER — APPOINTMENT (OUTPATIENT)
Dept: INTERNAL MEDICINE | Facility: CLINIC | Age: 70
End: 2025-02-11
Payer: MEDICARE

## 2025-02-11 ENCOUNTER — NON-APPOINTMENT (OUTPATIENT)
Age: 70
End: 2025-02-11

## 2025-02-11 VITALS
DIASTOLIC BLOOD PRESSURE: 72 MMHG | OXYGEN SATURATION: 97 % | HEART RATE: 55 BPM | HEIGHT: 61 IN | BODY MASS INDEX: 21.14 KG/M2 | SYSTOLIC BLOOD PRESSURE: 132 MMHG | WEIGHT: 112 LBS

## 2025-02-11 DIAGNOSIS — G25.0 ESSENTIAL TREMOR: ICD-10-CM

## 2025-02-11 DIAGNOSIS — E55.9 VITAMIN D DEFICIENCY, UNSPECIFIED: ICD-10-CM

## 2025-02-11 DIAGNOSIS — E78.5 HYPERLIPIDEMIA, UNSPECIFIED: ICD-10-CM

## 2025-02-11 DIAGNOSIS — H81.09 MENIERE'S DISEASE, UNSPECIFIED EAR: ICD-10-CM

## 2025-02-11 DIAGNOSIS — B00.9 HERPESVIRAL INFECTION, UNSPECIFIED: ICD-10-CM

## 2025-02-11 DIAGNOSIS — M85.80 OTHER SPECIFIED DISORDERS OF BONE DENSITY AND STRUCTURE, UNSPECIFIED SITE: ICD-10-CM

## 2025-02-11 DIAGNOSIS — R00.1 BRADYCARDIA, UNSPECIFIED: ICD-10-CM

## 2025-02-11 PROCEDURE — 93000 ELECTROCARDIOGRAM COMPLETE: CPT | Mod: 59

## 2025-02-11 PROCEDURE — G2211 COMPLEX E/M VISIT ADD ON: CPT

## 2025-02-11 PROCEDURE — 99214 OFFICE O/P EST MOD 30 MIN: CPT | Mod: 25

## 2025-02-11 PROCEDURE — G0439: CPT

## 2025-02-11 PROCEDURE — G0444 DEPRESSION SCREEN ANNUAL: CPT | Mod: 59

## 2025-04-04 ENCOUNTER — RX RENEWAL (OUTPATIENT)
Age: 70
End: 2025-04-04

## 2025-05-07 ENCOUNTER — RESULT REVIEW (OUTPATIENT)
Age: 70
End: 2025-05-07

## 2025-05-07 ENCOUNTER — APPOINTMENT (OUTPATIENT)
Dept: MAMMOGRAPHY | Facility: CLINIC | Age: 70
End: 2025-05-07
Payer: MEDICARE

## 2025-05-07 PROCEDURE — 77063 BREAST TOMOSYNTHESIS BI: CPT

## 2025-05-07 PROCEDURE — 77067 SCR MAMMO BI INCL CAD: CPT

## 2025-05-29 ENCOUNTER — APPOINTMENT (OUTPATIENT)
Dept: OBGYN | Facility: CLINIC | Age: 70
End: 2025-05-29
Payer: MEDICARE

## 2025-05-29 ENCOUNTER — NON-APPOINTMENT (OUTPATIENT)
Age: 70
End: 2025-05-29

## 2025-05-29 VITALS — DIASTOLIC BLOOD PRESSURE: 72 MMHG | SYSTOLIC BLOOD PRESSURE: 115 MMHG

## 2025-05-29 DIAGNOSIS — Z01.419 ENCOUNTER FOR GYNECOLOGICAL EXAMINATION (GENERAL) (ROUTINE) W/OUT ABNORMAL FINDINGS: ICD-10-CM

## 2025-05-29 DIAGNOSIS — N89.8 OTHER SPECIFIED NONINFLAMMATORY DISORDERS OF VAGINA: ICD-10-CM

## 2025-05-29 PROCEDURE — 99459 PELVIC EXAMINATION: CPT

## 2025-05-29 PROCEDURE — G0444 DEPRESSION SCREEN ANNUAL: CPT | Mod: 59

## 2025-05-29 PROCEDURE — 99387 INIT PM E/M NEW PAT 65+ YRS: CPT

## 2025-05-29 RX ORDER — ESTRADIOL 0.1 MG/G
0.1 CREAM VAGINAL
Qty: 1 | Refills: 3 | Status: ACTIVE | COMMUNITY
Start: 2025-05-29 | End: 1900-01-01

## 2025-06-02 LAB — CYTOLOGY CVX/VAG DOC THIN PREP: ABNORMAL
